# Patient Record
Sex: FEMALE | Race: WHITE | NOT HISPANIC OR LATINO | URBAN - METROPOLITAN AREA
[De-identification: names, ages, dates, MRNs, and addresses within clinical notes are randomized per-mention and may not be internally consistent; named-entity substitution may affect disease eponyms.]

---

## 2017-01-31 ENCOUNTER — OUTPATIENT (OUTPATIENT)
Dept: OUTPATIENT SERVICES | Facility: HOSPITAL | Age: 57
LOS: 1 days | End: 2017-01-31
Payer: COMMERCIAL

## 2017-01-31 ENCOUNTER — APPOINTMENT (OUTPATIENT)
Dept: ULTRASOUND IMAGING | Facility: IMAGING CENTER | Age: 57
End: 2017-01-31

## 2017-01-31 DIAGNOSIS — Z00.8 ENCOUNTER FOR OTHER GENERAL EXAMINATION: ICD-10-CM

## 2017-01-31 PROCEDURE — 76642 ULTRASOUND BREAST LIMITED: CPT

## 2017-01-31 PROCEDURE — 76642 ULTRASOUND BREAST LIMITED: CPT | Mod: 26,RT

## 2017-02-22 ENCOUNTER — APPOINTMENT (OUTPATIENT)
Dept: SURGICAL ONCOLOGY | Facility: CLINIC | Age: 57
End: 2017-02-22

## 2017-02-22 VITALS
WEIGHT: 138 LBS | HEIGHT: 62 IN | SYSTOLIC BLOOD PRESSURE: 129 MMHG | DIASTOLIC BLOOD PRESSURE: 85 MMHG | BODY MASS INDEX: 25.4 KG/M2 | TEMPERATURE: 97.9 F | HEART RATE: 68 BPM | OXYGEN SATURATION: 100 % | RESPIRATION RATE: 14 BRPM

## 2017-03-10 ENCOUNTER — APPOINTMENT (OUTPATIENT)
Dept: ORTHOPEDIC SURGERY | Facility: CLINIC | Age: 57
End: 2017-03-10

## 2017-03-10 VITALS
HEART RATE: 73 BPM | SYSTOLIC BLOOD PRESSURE: 139 MMHG | HEIGHT: 62 IN | BODY MASS INDEX: 25.4 KG/M2 | DIASTOLIC BLOOD PRESSURE: 85 MMHG | WEIGHT: 138 LBS

## 2017-03-10 DIAGNOSIS — M25.511 PAIN IN RIGHT SHOULDER: ICD-10-CM

## 2017-06-06 ENCOUNTER — APPOINTMENT (OUTPATIENT)
Dept: ORTHOPEDIC SURGERY | Facility: CLINIC | Age: 57
End: 2017-06-06

## 2017-06-06 VITALS — WEIGHT: 138 LBS | HEIGHT: 62 IN | BODY MASS INDEX: 25.4 KG/M2

## 2017-06-06 DIAGNOSIS — M75.51 BURSITIS OF RIGHT SHOULDER: ICD-10-CM

## 2017-06-07 PROBLEM — M75.51 ACUTE BURSITIS OF RIGHT SHOULDER: Status: ACTIVE | Noted: 2017-03-13

## 2017-07-19 ENCOUNTER — APPOINTMENT (OUTPATIENT)
Dept: MAMMOGRAPHY | Facility: CLINIC | Age: 57
End: 2017-07-19

## 2017-07-19 ENCOUNTER — APPOINTMENT (OUTPATIENT)
Dept: ULTRASOUND IMAGING | Facility: CLINIC | Age: 57
End: 2017-07-19

## 2017-08-07 ENCOUNTER — APPOINTMENT (OUTPATIENT)
Dept: MAMMOGRAPHY | Facility: CLINIC | Age: 57
End: 2017-08-07

## 2017-08-07 ENCOUNTER — APPOINTMENT (OUTPATIENT)
Dept: ULTRASOUND IMAGING | Facility: CLINIC | Age: 57
End: 2017-08-07

## 2017-08-07 ENCOUNTER — OUTPATIENT (OUTPATIENT)
Dept: OUTPATIENT SERVICES | Facility: HOSPITAL | Age: 57
LOS: 1 days | End: 2017-08-07
Payer: COMMERCIAL

## 2017-08-07 DIAGNOSIS — Z00.8 ENCOUNTER FOR OTHER GENERAL EXAMINATION: ICD-10-CM

## 2017-08-07 PROCEDURE — 77065 DX MAMMO INCL CAD UNI: CPT

## 2017-08-07 PROCEDURE — 77063 BREAST TOMOSYNTHESIS BI: CPT

## 2017-08-07 PROCEDURE — 77067 SCR MAMMO BI INCL CAD: CPT

## 2017-08-07 PROCEDURE — G0279: CPT

## 2017-08-07 PROCEDURE — 77066 DX MAMMO INCL CAD BI: CPT

## 2017-08-07 PROCEDURE — 76641 ULTRASOUND BREAST COMPLETE: CPT

## 2017-08-10 DIAGNOSIS — N63 UNSPECIFIED LUMP IN BREAST: ICD-10-CM

## 2017-08-10 DIAGNOSIS — R92.2 INCONCLUSIVE MAMMOGRAM: ICD-10-CM

## 2017-08-10 DIAGNOSIS — R92.1 MAMMOGRAPHIC CALCIFICATION FOUND ON DIAGNOSTIC IMAGING OF BREAST: ICD-10-CM

## 2017-08-10 DIAGNOSIS — Z12.31 ENCOUNTER FOR SCREENING MAMMOGRAM FOR MALIGNANT NEOPLASM OF BREAST: ICD-10-CM

## 2017-09-25 ENCOUNTER — APPOINTMENT (OUTPATIENT)
Dept: SURGICAL ONCOLOGY | Facility: CLINIC | Age: 57
End: 2017-09-25
Payer: COMMERCIAL

## 2017-09-25 VITALS
HEART RATE: 68 BPM | SYSTOLIC BLOOD PRESSURE: 126 MMHG | RESPIRATION RATE: 16 BRPM | HEIGHT: 62 IN | BODY MASS INDEX: 25.76 KG/M2 | WEIGHT: 140 LBS | DIASTOLIC BLOOD PRESSURE: 78 MMHG | OXYGEN SATURATION: 98 %

## 2017-09-25 PROCEDURE — 99214 OFFICE O/P EST MOD 30 MIN: CPT

## 2017-11-17 ENCOUNTER — OUTPATIENT (OUTPATIENT)
Dept: OUTPATIENT SERVICES | Facility: HOSPITAL | Age: 57
LOS: 1 days | End: 2017-11-17
Payer: COMMERCIAL

## 2017-11-17 ENCOUNTER — APPOINTMENT (OUTPATIENT)
Dept: ULTRASOUND IMAGING | Facility: CLINIC | Age: 57
End: 2017-11-17
Payer: COMMERCIAL

## 2017-11-17 DIAGNOSIS — Z00.8 ENCOUNTER FOR OTHER GENERAL EXAMINATION: ICD-10-CM

## 2017-11-17 PROCEDURE — 76856 US EXAM PELVIC COMPLETE: CPT

## 2017-11-17 PROCEDURE — 76830 TRANSVAGINAL US NON-OB: CPT | Mod: 26

## 2017-11-17 PROCEDURE — 76856 US EXAM PELVIC COMPLETE: CPT | Mod: 26

## 2017-11-17 PROCEDURE — 76830 TRANSVAGINAL US NON-OB: CPT

## 2018-02-05 ENCOUNTER — RESULT REVIEW (OUTPATIENT)
Age: 58
End: 2018-02-05

## 2018-02-15 ENCOUNTER — APPOINTMENT (OUTPATIENT)
Dept: MAMMOGRAPHY | Facility: CLINIC | Age: 58
End: 2018-02-15
Payer: COMMERCIAL

## 2018-02-15 ENCOUNTER — OUTPATIENT (OUTPATIENT)
Dept: OUTPATIENT SERVICES | Facility: HOSPITAL | Age: 58
LOS: 1 days | End: 2018-02-15
Payer: COMMERCIAL

## 2018-02-15 DIAGNOSIS — Z00.8 ENCOUNTER FOR OTHER GENERAL EXAMINATION: ICD-10-CM

## 2018-02-15 PROCEDURE — G0279: CPT | Mod: 26

## 2018-02-15 PROCEDURE — 77065 DX MAMMO INCL CAD UNI: CPT | Mod: 26,LT

## 2018-02-15 PROCEDURE — 77065 DX MAMMO INCL CAD UNI: CPT

## 2018-02-15 PROCEDURE — G0279: CPT

## 2018-02-19 ENCOUNTER — APPOINTMENT (OUTPATIENT)
Dept: ULTRASOUND IMAGING | Facility: CLINIC | Age: 58
End: 2018-02-19
Payer: COMMERCIAL

## 2018-02-19 ENCOUNTER — OUTPATIENT (OUTPATIENT)
Dept: OUTPATIENT SERVICES | Facility: HOSPITAL | Age: 58
LOS: 1 days | End: 2018-02-19
Payer: COMMERCIAL

## 2018-02-19 DIAGNOSIS — Z00.8 ENCOUNTER FOR OTHER GENERAL EXAMINATION: ICD-10-CM

## 2018-02-19 PROCEDURE — 76856 US EXAM PELVIC COMPLETE: CPT | Mod: 26

## 2018-02-19 PROCEDURE — 76856 US EXAM PELVIC COMPLETE: CPT

## 2018-02-19 PROCEDURE — 76830 TRANSVAGINAL US NON-OB: CPT

## 2018-02-19 PROCEDURE — 76830 TRANSVAGINAL US NON-OB: CPT | Mod: 26

## 2018-02-26 ENCOUNTER — APPOINTMENT (OUTPATIENT)
Dept: SURGICAL ONCOLOGY | Facility: CLINIC | Age: 58
End: 2018-02-26
Payer: COMMERCIAL

## 2018-02-26 VITALS
HEART RATE: 66 BPM | BODY MASS INDEX: 25.76 KG/M2 | HEIGHT: 62 IN | WEIGHT: 140 LBS | RESPIRATION RATE: 15 BRPM | SYSTOLIC BLOOD PRESSURE: 119 MMHG | DIASTOLIC BLOOD PRESSURE: 81 MMHG

## 2018-02-26 PROCEDURE — 99214 OFFICE O/P EST MOD 30 MIN: CPT

## 2018-08-16 ENCOUNTER — APPOINTMENT (OUTPATIENT)
Dept: MAMMOGRAPHY | Facility: CLINIC | Age: 58
End: 2018-08-16
Payer: COMMERCIAL

## 2018-08-16 ENCOUNTER — APPOINTMENT (OUTPATIENT)
Dept: ULTRASOUND IMAGING | Facility: CLINIC | Age: 58
End: 2018-08-16
Payer: COMMERCIAL

## 2018-08-16 ENCOUNTER — OUTPATIENT (OUTPATIENT)
Dept: OUTPATIENT SERVICES | Facility: HOSPITAL | Age: 58
LOS: 1 days | End: 2018-08-16
Payer: COMMERCIAL

## 2018-08-16 DIAGNOSIS — Z00.8 ENCOUNTER FOR OTHER GENERAL EXAMINATION: ICD-10-CM

## 2018-08-16 PROCEDURE — G0279: CPT | Mod: 26

## 2018-08-16 PROCEDURE — 76641 ULTRASOUND BREAST COMPLETE: CPT | Mod: 26,50

## 2018-08-16 PROCEDURE — 77066 DX MAMMO INCL CAD BI: CPT | Mod: 26

## 2018-08-16 PROCEDURE — G0279: CPT

## 2018-08-16 PROCEDURE — 76641 ULTRASOUND BREAST COMPLETE: CPT

## 2018-08-16 PROCEDURE — 77066 DX MAMMO INCL CAD BI: CPT

## 2018-08-27 ENCOUNTER — APPOINTMENT (OUTPATIENT)
Dept: SURGICAL ONCOLOGY | Facility: CLINIC | Age: 58
End: 2018-08-27
Payer: COMMERCIAL

## 2018-08-27 VITALS
WEIGHT: 140 LBS | BODY MASS INDEX: 25.76 KG/M2 | RESPIRATION RATE: 15 BRPM | DIASTOLIC BLOOD PRESSURE: 83 MMHG | SYSTOLIC BLOOD PRESSURE: 138 MMHG | HEIGHT: 62 IN | HEART RATE: 69 BPM

## 2018-08-27 DIAGNOSIS — R92.1 MAMMOGRAPHIC CALCIFICATION FOUND ON DIAGNOSTIC IMAGING OF BREAST: ICD-10-CM

## 2018-08-27 PROCEDURE — 99214 OFFICE O/P EST MOD 30 MIN: CPT

## 2018-09-23 ENCOUNTER — TRANSCRIPTION ENCOUNTER (OUTPATIENT)
Age: 58
End: 2018-09-23

## 2018-11-21 ENCOUNTER — OUTPATIENT (OUTPATIENT)
Dept: OUTPATIENT SERVICES | Facility: HOSPITAL | Age: 58
LOS: 1 days | End: 2018-11-21
Payer: COMMERCIAL

## 2018-11-21 ENCOUNTER — APPOINTMENT (OUTPATIENT)
Dept: RADIOLOGY | Facility: CLINIC | Age: 58
End: 2018-11-21
Payer: COMMERCIAL

## 2018-11-21 DIAGNOSIS — Z00.8 ENCOUNTER FOR OTHER GENERAL EXAMINATION: ICD-10-CM

## 2018-11-21 PROCEDURE — 71046 X-RAY EXAM CHEST 2 VIEWS: CPT | Mod: 26

## 2018-11-21 PROCEDURE — 71046 X-RAY EXAM CHEST 2 VIEWS: CPT

## 2019-02-11 ENCOUNTER — RESULT REVIEW (OUTPATIENT)
Age: 59
End: 2019-02-11

## 2019-03-04 ENCOUNTER — TRANSCRIPTION ENCOUNTER (OUTPATIENT)
Age: 59
End: 2019-03-04

## 2019-07-22 ENCOUNTER — EMERGENCY (EMERGENCY)
Facility: HOSPITAL | Age: 59
LOS: 0 days | Discharge: HOME | End: 2019-07-22
Attending: EMERGENCY MEDICINE | Admitting: EMERGENCY MEDICINE
Payer: COMMERCIAL

## 2019-07-22 ENCOUNTER — TRANSCRIPTION ENCOUNTER (OUTPATIENT)
Age: 59
End: 2019-07-22

## 2019-07-22 VITALS
HEART RATE: 65 BPM | OXYGEN SATURATION: 100 % | RESPIRATION RATE: 17 BRPM | TEMPERATURE: 98 F | HEIGHT: 63 IN | DIASTOLIC BLOOD PRESSURE: 86 MMHG | SYSTOLIC BLOOD PRESSURE: 145 MMHG | WEIGHT: 139.99 LBS
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
## 2019-07-22 DIAGNOSIS — S61.211A LACERATION WITHOUT FOREIGN BODY OF LEFT INDEX FINGER WITHOUT DAMAGE TO NAIL, INITIAL ENCOUNTER: ICD-10-CM
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
## 2019-07-22 PROCEDURE — 99283 EMERGENCY DEPT VISIT LOW MDM: CPT

## 2019-07-22 PROCEDURE — 29130 APPL FINGER SPLINT STATIC: CPT | Mod: F1

## 2019-07-22 PROCEDURE — 12001 RPR S/N/AX/GEN/TRNK 2.5CM/<: CPT | Mod: 59

## 2019-07-22 NOTE — ED STATDOCS - PROGRESS NOTE DETAILS
GRACE Jerome:   Patient has been seen, evaluated and orders have been written by the attending in intake. Patient is stable.  I will follow up the results of orders written and I will continue to evaluate/observe the patient.  Emeli Jerome PA-C Superficial lac to left index finger irrigated copiously.  Applied mult levels of Dermabond s incident.  Covered with Band aid and finger splint applied.  Td UTD.   DC home.  Emeli Jerome PA-C

## 2019-07-22 NOTE — ED STATDOCS - SKIN, MLM
skin normal color for race, warm, dry. + laceration to dorsal aspect of left index finger, proximal phalanx

## 2019-07-22 NOTE — ED PROCEDURE NOTE - PROCEDURE ADDITIONAL DETAILS
1.5 cm lac to left 2nd finger prox to PIP joint.  Wound not on joint.  Able to FROM without wound opening.  Neg tendon exposed or damage.  Dermabond without incident.  DAISHA fuentes PA-C

## 2019-07-22 NOTE — ED PROCEDURE NOTE - CPROC ED POST PROC CARE GUIDE1
Instructed patient/caregiver regarding signs and symptoms of infection./Elevate the injured extremity as instructed.
Instructed patient/caregiver regarding signs and symptoms of infection.

## 2019-07-22 NOTE — ED STATDOCS - OBJECTIVE STATEMENT
60 y/o female with no relevant PMHx presents to the ED c/o laceration to her left index finger x today. Pt states that she was cutting watermelon today when the knife slipped and the pt cut her index finger. Pt states she immediately put pressure on the wound and wrapped it. Pt is right hand dominant. Tetanus shot is UTD.

## 2019-07-22 NOTE — ED STATDOCS - ATTENDING CONTRIBUTION TO CARE
Attending Contribution to Care: I, Ladi Hoyos, performed the initial face to face bedside interview with this patient regarding history of present illness, review of symptoms and relevant past medical, social and family history.  I completed an independent physical examination.  I was the initial provider who evaluated this patient and the history, physical, and MDM reflect this intial assessment. I have signed out the follow up of any pending tests after the original (i.e. labs, radiological studies) to the ACP with instructions to review any with instructions to review any concerning findings to me prior to discharge.  I have communicated the patient’s plan of care and disposition with the ACP.

## 2019-08-06 ENCOUNTER — FORM ENCOUNTER (OUTPATIENT)
Age: 59
End: 2019-08-06

## 2019-08-07 ENCOUNTER — APPOINTMENT (OUTPATIENT)
Dept: ULTRASOUND IMAGING | Facility: CLINIC | Age: 59
End: 2019-08-07
Payer: COMMERCIAL

## 2019-08-07 ENCOUNTER — OUTPATIENT (OUTPATIENT)
Dept: OUTPATIENT SERVICES | Facility: HOSPITAL | Age: 59
LOS: 1 days | End: 2019-08-07
Payer: COMMERCIAL

## 2019-08-07 ENCOUNTER — APPOINTMENT (OUTPATIENT)
Dept: RADIOLOGY | Facility: CLINIC | Age: 59
End: 2019-08-07
Payer: COMMERCIAL

## 2019-08-07 ENCOUNTER — APPOINTMENT (OUTPATIENT)
Dept: MAMMOGRAPHY | Facility: CLINIC | Age: 59
End: 2019-08-07
Payer: COMMERCIAL

## 2019-08-07 DIAGNOSIS — Z00.8 ENCOUNTER FOR OTHER GENERAL EXAMINATION: ICD-10-CM

## 2019-08-07 DIAGNOSIS — R92.2 INCONCLUSIVE MAMMOGRAM: ICD-10-CM

## 2019-08-07 DIAGNOSIS — Z12.31 ENCOUNTER FOR SCREENING MAMMOGRAM FOR MALIGNANT NEOPLASM OF BREAST: ICD-10-CM

## 2019-08-07 DIAGNOSIS — Z13.820 ENCOUNTER FOR SCREENING FOR OSTEOPOROSIS: ICD-10-CM

## 2019-08-07 PROCEDURE — 77080 DXA BONE DENSITY AXIAL: CPT | Mod: 26

## 2019-08-07 PROCEDURE — 77063 BREAST TOMOSYNTHESIS BI: CPT | Mod: 26

## 2019-08-07 PROCEDURE — 76641 ULTRASOUND BREAST COMPLETE: CPT | Mod: 26,50

## 2019-08-07 PROCEDURE — 77067 SCR MAMMO BI INCL CAD: CPT | Mod: 26

## 2019-08-07 PROCEDURE — 76830 TRANSVAGINAL US NON-OB: CPT

## 2019-08-07 PROCEDURE — 76830 TRANSVAGINAL US NON-OB: CPT | Mod: 26

## 2019-08-07 PROCEDURE — 76856 US EXAM PELVIC COMPLETE: CPT | Mod: 26

## 2019-08-07 PROCEDURE — 76856 US EXAM PELVIC COMPLETE: CPT

## 2019-08-07 PROCEDURE — 77063 BREAST TOMOSYNTHESIS BI: CPT

## 2019-08-07 PROCEDURE — 77067 SCR MAMMO BI INCL CAD: CPT

## 2019-08-07 PROCEDURE — 77080 DXA BONE DENSITY AXIAL: CPT

## 2019-08-07 PROCEDURE — 76641 ULTRASOUND BREAST COMPLETE: CPT

## 2019-09-04 ENCOUNTER — APPOINTMENT (OUTPATIENT)
Dept: SURGICAL ONCOLOGY | Facility: CLINIC | Age: 59
End: 2019-09-04
Payer: COMMERCIAL

## 2019-09-04 VITALS
HEIGHT: 62 IN | RESPIRATION RATE: 17 BRPM | BODY MASS INDEX: 25.76 KG/M2 | SYSTOLIC BLOOD PRESSURE: 124 MMHG | TEMPERATURE: 98.1 F | HEART RATE: 70 BPM | WEIGHT: 140 LBS | DIASTOLIC BLOOD PRESSURE: 77 MMHG

## 2019-09-04 PROCEDURE — 99214 OFFICE O/P EST MOD 30 MIN: CPT

## 2019-09-04 NOTE — HISTORY OF PRESENT ILLNESS
[de-identified] : Zoe is a 59 year old female who presents to office for her annual Breast Exam follow up visit.\par \par Hx: benign RIGHT breast surgical biopsy on May 4, 2005; benign RIGHT stereotactic biopsy in 2007; benign RIGHT ultrasound-guided cyst aspiration on January 15, 2014. \par \par Most recent bilateral mammogram/sonogram performed on 8/7/19 demonstrated extremely dense breast tissue, stable focal calcifications left breast 3:00 anteriorly, no mammographic evidence of malignancy, multiple stable hypoechoic nodules in the left breast. IMP: right breast cyst and stable hypoechoic nodules left breast (BIRADS 2).  At this time patient denies breast pain, palpable masses and/or nipple discharge.  \par \par Denies personal or family history of breast or ovarian cancer. \par \par Internist/Gyn: Dr. Cosby

## 2019-09-04 NOTE — CONSULT LETTER
[Dear  ___] : Dear  [unfilled], [Please see my note below.] : Please see my note below. [Courtesy Letter:] : I had the pleasure of seeing your patient, [unfilled], in my office today. [Sincerely,] : Sincerely, [Consult Closing:] : Thank you very much for allowing me to participate in the care of this patient.  If you have any questions, please do not hesitate to contact me. [FreeTextEntry1] : I will keep you informed of my annual follow-up. [DrBrittney  ___] : Dr. DOMINGUEZ [FreeTextEntry3] : Bhavesh Banks MD FACS\par Chief of Surgical Oncology\par \par

## 2019-09-04 NOTE — ASSESSMENT
[FreeTextEntry1] : IMP:\par Fibrocystic changes\par \par \par PLAN:\par RTO yearly\par Bilateral mammo/sono Aug 2020

## 2019-09-04 NOTE — PHYSICAL EXAM
[Normal] : supple, no neck mass and thyroid not enlarged [Normal Supraclavicular Lymph Nodes] : normal supraclavicular lymph nodes [Normal Axillary Lymph Nodes] : normal axillary lymph nodes [Normal] : oriented to person, place and time, with appropriate affect [de-identified] : Fibrocystic but no masses or nipple discharge bilaterally.  [FreeTextEntry1] : Deferred

## 2020-03-04 ENCOUNTER — RESULT REVIEW (OUTPATIENT)
Age: 60
End: 2020-03-04

## 2020-04-25 ENCOUNTER — MESSAGE (OUTPATIENT)
Age: 60
End: 2020-04-25

## 2020-05-05 LAB
SARS-COV-2 IGG SERPL IA-ACNC: 0.9 RATIO
SARS-COV-2 IGG SERPL QL IA: ABNORMAL

## 2020-10-06 NOTE — ED STATDOCS - TOBACCO USE
Physical Therapy    Facility/Department: [facility] ICU  Initial Assessment/Discharge Summary    NAME: Gray Cason  : 1948  MRN: [id number]    Date of Service: 10/6/2020    Discharge Recommendations:  Gray Cason scored a 24/24 on the AM-PAC short mobility form. At this time, no further PT is recommended upon discharge due to pt returning to baseline function. Recommend patient returns to prior setting with prior services. PT Equipment Recommendations  Equipment Needed: No    Assessment   Assessment: Pt presents at baseline function, no additional PT indicated  Decision Making: Low Complexity  PT Education: PT Role;Plan of Care  Patient Education: Pt educated on DC plan, verbalized understanding  Barriers to Learning: none  No Skilled PT: At baseline function  REQUIRES PT FOLLOW UP: No  Activity Tolerance  Activity Tolerance: Patient Tolerated treatment well  Activity Tolerance: Pt tolerated treatment very well and was able to complete all functional tasks independently and without exacerbation of any symptoms. Patient Diagnosis(es): The primary encounter diagnosis was TIA (transient ischemic attack). A diagnosis of Uncontrolled hypertension was also pertinent to this visit. has a past medical history of Bulging lumbar disc, Degenerative disc disease, lumbar, and Thyroid disease. has a past surgical history that includes Blepharoplasty (Left) and Dental surgery. Restrictions  Restrictions/Precautions  Restrictions/Precautions: Fall Risk(Medium fall risk)  Required Braces or Orthoses?: No  Position Activity Restriction  Other position/activity restrictions: 67 y.o. female who presents to the emergency department with chief complaint of expressive aphasia-word searching, left-sided headache and vision changes. Last occurred on Friday. Since then she is had elevated blood pressure despite taking her blood pressure medications. No chest pain shortness of breath or current headache.   Her headache the other day when the symptoms were occurring worse left-sided parietal achy 4/10. Vision/Hearing  Vision: Impaired  Vision Exceptions: Wears glasses for reading  Hearing: Within functional limits     Subjective  General  Chart Reviewed: Yes  Additional Pertinent Hx: DDD lumbar spine with bulging disc addressed in ER 8/2020, hypthyroidism  Family / Caregiver Present: No  Diagnosis: TIA  Follows Commands: Within Functional Limits  General Comment  Comments: Pt found laying in bed awake  Subjective  Subjective: Pt denies any pain at this time. Reports feeling great and like her normal self.   Pain Screening  Patient Currently in Pain: Denies  Vital Signs  Patient Currently in Pain: Denies     Orientation  Orientation  Overall Orientation Status: Within Functional Limits  Social/Functional History  Social/Functional History  Lives With: Spouse()  Type of Home: House  Home Layout: Two level, Bed/Bath upstairs, Laundry in basement  Home Access: Stairs to enter without rails  Entrance Stairs - Number of Steps: 2 steps to enter  Bathroom Shower/Tub: Walk-in shower  Bathroom Toilet: Standard(Raised)  Bathroom Equipment: Shower chair  Home Equipment: Cane, 4 wheeled walker(Doesn't currently use them)  Receives Help From: Family(spouse primarily, daughter can if needed)  ADL Assistance: Independent  Homemaking Assistance: Independent  Homemaking Responsibilities: Yes  Ambulation Assistance: Independent  Transfer Assistance: Independent  Active : Yes  Mode of Transportation: Car  Occupation: Retired  Type of occupation: teacher  Leisure & Hobbies: visiting family, reading, walk new dog  Additional Comments: no falls in past 6 months  Cognition      Objective  Observation/Palpation  Posture: Good    AROM RLE (degrees)  RLE AROM: WFL  AROM LLE (degrees)  LLE AROM : WFL  Strength RLE  Strength RLE: WFL  Comment: hip flexion 4+/5, knee extension and dorsiflexion 5/5  Strength LLE  Strength LLE: WFL  Comment: hip flexion 4+/5, knee extension and dorsiflexion 5/5  Coordination  Heel to Shin: Normal  Sensation  Overall Sensation Status: WFL  Bed mobility  Supine to Sit: Independent  Scooting: Independent  Transfers  Sit to Stand: Independent  Stand to sit: Independent  Ambulation  Ambulation?: Yes  Ambulation 1  Surface: level tile  Device: No Device  Assistance: Independent  Quality of Gait: Good nitza with step throuh gait pattern. Appropriate step length as well. Pt tends to walk either with a slightly widened CLEO, but will adjust occassionally without cuing. Gait Deviations: None  Distance: 550 feet  Stairs/Curb  Stairs?: Yes  Stairs  # Steps : 12  Rails: None(Pt did not utilize rails ascending and descending)  Device: No Device  Assistance: Independent  Comment: Pt demonstrated a reciprocal pattern with ascending and descending stairs. Did not utilize the railings for any ambulation. Balance  Posture: Good  Sitting - Static: Good  Sitting - Dynamic: Good  Standing - Static: Good  Standing - Dynamic: Good      Plan   Plan  Times per week: DC acute PT  Safety Devices  Type of devices: Call light within reach, Left in chair, Nurse notified, Patient at risk for falls    AM-PAC Score  AM-PAC Inpatient Mobility Raw Score : 24 (10/06/20 1249)  AM-PAC Inpatient T-Scale Score : 61.14 (10/06/20 1249)  Mobility Inpatient CMS 0-100% Score: 0 (10/06/20 1249)  Mobility Inpatient CMS G-Code Modifier : [address] (10/06/20 1249)     Goals  Patient Goals   Patient goals : No acute PT goals       Therapy Time   Individual Concurrent Group Co-treatment   Time In 1046         Time Out 1106         Minutes 20         Timed Code Treatment Minutes: 0 Minutes     Janiya Esquivel, SPT  Eboni Grimes, PT  Therapist observed and directed the above evaluation.  Thanks, Aissatou Mejias, DPT [id number] Unknown if ever smoked

## 2020-10-09 ENCOUNTER — OUTPATIENT (OUTPATIENT)
Dept: OUTPATIENT SERVICES | Facility: HOSPITAL | Age: 60
LOS: 1 days | End: 2020-10-09
Payer: COMMERCIAL

## 2020-10-09 ENCOUNTER — APPOINTMENT (OUTPATIENT)
Dept: ULTRASOUND IMAGING | Facility: CLINIC | Age: 60
End: 2020-10-09
Payer: COMMERCIAL

## 2020-10-09 ENCOUNTER — RESULT REVIEW (OUTPATIENT)
Age: 60
End: 2020-10-09

## 2020-10-09 ENCOUNTER — APPOINTMENT (OUTPATIENT)
Dept: MAMMOGRAPHY | Facility: CLINIC | Age: 60
End: 2020-10-09
Payer: COMMERCIAL

## 2020-10-09 DIAGNOSIS — Z00.8 ENCOUNTER FOR OTHER GENERAL EXAMINATION: ICD-10-CM

## 2020-10-09 PROCEDURE — 77067 SCR MAMMO BI INCL CAD: CPT

## 2020-10-09 PROCEDURE — 77067 SCR MAMMO BI INCL CAD: CPT | Mod: 26

## 2020-10-09 PROCEDURE — 77063 BREAST TOMOSYNTHESIS BI: CPT | Mod: 26

## 2020-10-09 PROCEDURE — 76641 ULTRASOUND BREAST COMPLETE: CPT | Mod: 26,50

## 2020-10-09 PROCEDURE — 77063 BREAST TOMOSYNTHESIS BI: CPT

## 2020-10-09 PROCEDURE — 76641 ULTRASOUND BREAST COMPLETE: CPT

## 2020-10-29 ENCOUNTER — APPOINTMENT (OUTPATIENT)
Dept: SURGICAL ONCOLOGY | Facility: CLINIC | Age: 60
End: 2020-10-29
Payer: COMMERCIAL

## 2020-10-29 VITALS
HEIGHT: 62 IN | WEIGHT: 140 LBS | RESPIRATION RATE: 16 BRPM | TEMPERATURE: 97.9 F | SYSTOLIC BLOOD PRESSURE: 135 MMHG | DIASTOLIC BLOOD PRESSURE: 83 MMHG | HEART RATE: 65 BPM | BODY MASS INDEX: 25.76 KG/M2 | OXYGEN SATURATION: 98 %

## 2020-10-29 PROCEDURE — 99214 OFFICE O/P EST MOD 30 MIN: CPT

## 2020-10-29 PROCEDURE — 99072 ADDL SUPL MATRL&STAF TM PHE: CPT

## 2020-10-29 NOTE — CONSULT LETTER
[Dear  ___] : Dear  [unfilled], [Courtesy Letter:] : I had the pleasure of seeing your patient, [unfilled], in my office today. [Please see my note below.] : Please see my note below. [Consult Closing:] : Thank you very much for allowing me to participate in the care of this patient.  If you have any questions, please do not hesitate to contact me. [Sincerely,] : Sincerely, [FreeTextEntry1] : I will keep you informed of my annual follow-up. [FreeTextEntry3] : Bhavesh Banks MD FACS\par Chief of Surgical Oncology\par \par

## 2020-10-29 NOTE — ASSESSMENT
[FreeTextEntry1] : IMP:\par Fibrocystic changes\par No evidence of new or recurrent lesions. Breast imaging failed to identify any suspicious lesions. BI-RADS 1. \par No suspicious lesion on exam. \par \par \par PLAN:\par RTO yearly\par Bilateral mammo/Sono 10/ 2021

## 2020-10-29 NOTE — PHYSICAL EXAM
[Normal] : supple, no neck mass and thyroid not enlarged [Normal Supraclavicular Lymph Nodes] : normal supraclavicular lymph nodes [Normal Axillary Lymph Nodes] : normal axillary lymph nodes [Normal] : oriented to person, place and time, with appropriate affect [de-identified] : Fibrocystic but no masses or nipple discharge bilaterally.  [FreeTextEntry1] : Deferred

## 2020-10-29 NOTE — HISTORY OF PRESENT ILLNESS
[de-identified] : Zoe is a 60 year old female who presents to office for her annual Breast Exam follow up visit.\par \par Hx: benign RIGHT breast surgical biopsy on May 4, 2005; benign RIGHT stereotactic biopsy in 2007; benign RIGHT ultrasound-guided cyst aspiration on January 15, 2014. \par \par Most recent bilateral mammogram/sonogram performed on 10/9/2020  demonstrated dense breast tissue, Scattered additional calcifications are identified. The previously noted circumferential calcification within the posterior central left breast appear to have resorbed. Scattered diffuse nodularity is again noted bilaterally. Nodular asymmetry within the deep posterior outer right breast close to the chest wall appears unchanged. Vague nodular asymmetry within the inner left breast appears unchanged as well. No mammographic or sonographic evidence of malignancy. Stable left breast nodules on ultrasound, as above. BI-RADS 1.\par \par At this time patient denies breast pain, palpable masses and/or nipple discharge.  \par \par Denies personal or family history of breast or ovarian cancer. \par \par Internist/Gyn: Dr. Cosby

## 2021-04-22 ENCOUNTER — RESULT REVIEW (OUTPATIENT)
Age: 61
End: 2021-04-22

## 2021-07-31 ENCOUNTER — TRANSCRIPTION ENCOUNTER (OUTPATIENT)
Age: 61
End: 2021-07-31

## 2021-10-15 ENCOUNTER — RESULT REVIEW (OUTPATIENT)
Age: 61
End: 2021-10-15

## 2021-10-15 ENCOUNTER — APPOINTMENT (OUTPATIENT)
Dept: ULTRASOUND IMAGING | Facility: CLINIC | Age: 61
End: 2021-10-15
Payer: COMMERCIAL

## 2021-10-15 ENCOUNTER — APPOINTMENT (OUTPATIENT)
Dept: MAMMOGRAPHY | Facility: CLINIC | Age: 61
End: 2021-10-15
Payer: COMMERCIAL

## 2021-10-15 ENCOUNTER — OUTPATIENT (OUTPATIENT)
Dept: OUTPATIENT SERVICES | Facility: HOSPITAL | Age: 61
LOS: 1 days | End: 2021-10-15
Payer: COMMERCIAL

## 2021-10-15 DIAGNOSIS — Z12.31 ENCOUNTER FOR SCREENING MAMMOGRAM FOR MALIGNANT NEOPLASM OF BREAST: ICD-10-CM

## 2021-10-15 PROCEDURE — 77063 BREAST TOMOSYNTHESIS BI: CPT | Mod: 26

## 2021-10-15 PROCEDURE — 77067 SCR MAMMO BI INCL CAD: CPT

## 2021-10-15 PROCEDURE — 76641 ULTRASOUND BREAST COMPLETE: CPT | Mod: 26,50

## 2021-10-15 PROCEDURE — 77063 BREAST TOMOSYNTHESIS BI: CPT

## 2021-10-15 PROCEDURE — 77067 SCR MAMMO BI INCL CAD: CPT | Mod: 26

## 2021-10-15 PROCEDURE — 76641 ULTRASOUND BREAST COMPLETE: CPT

## 2021-11-01 ENCOUNTER — OUTPATIENT (OUTPATIENT)
Dept: OUTPATIENT SERVICES | Facility: HOSPITAL | Age: 61
LOS: 1 days | End: 2021-11-01
Payer: COMMERCIAL

## 2021-11-01 ENCOUNTER — APPOINTMENT (OUTPATIENT)
Dept: MAMMOGRAPHY | Facility: CLINIC | Age: 61
End: 2021-11-01
Payer: COMMERCIAL

## 2021-11-01 ENCOUNTER — APPOINTMENT (OUTPATIENT)
Dept: ULTRASOUND IMAGING | Facility: CLINIC | Age: 61
End: 2021-11-01
Payer: COMMERCIAL

## 2021-11-01 DIAGNOSIS — Z00.8 ENCOUNTER FOR OTHER GENERAL EXAMINATION: ICD-10-CM

## 2021-11-01 PROCEDURE — 76641 ULTRASOUND BREAST COMPLETE: CPT | Mod: 26,50

## 2021-11-01 PROCEDURE — 76641 ULTRASOUND BREAST COMPLETE: CPT

## 2021-11-15 ENCOUNTER — APPOINTMENT (OUTPATIENT)
Dept: SURGICAL ONCOLOGY | Facility: CLINIC | Age: 61
End: 2021-11-15
Payer: COMMERCIAL

## 2021-11-15 VITALS
BODY MASS INDEX: 26.13 KG/M2 | WEIGHT: 142 LBS | TEMPERATURE: 97.7 F | DIASTOLIC BLOOD PRESSURE: 84 MMHG | RESPIRATION RATE: 16 BRPM | HEIGHT: 62 IN | OXYGEN SATURATION: 99 % | SYSTOLIC BLOOD PRESSURE: 136 MMHG | HEART RATE: 67 BPM

## 2021-11-15 DIAGNOSIS — R92.8 OTHER ABNORMAL AND INCONCLUSIVE FINDINGS ON DIAGNOSTIC IMAGING OF BREAST: ICD-10-CM

## 2021-11-15 PROCEDURE — 99214 OFFICE O/P EST MOD 30 MIN: CPT

## 2021-11-15 NOTE — HISTORY OF PRESENT ILLNESS
[de-identified] : Zoe is a 61 year old female who presents to office for her annual Breast Exam follow up visit.\par \par Hx: benign RIGHT breast surgical biopsy on May 4, 2005; benign RIGHT stereotactic biopsy in 2007; benign RIGHT ultrasound-guided cyst aspiration on January 15, 2014. \par \par B/L Mammo/sono 10/15/2021-  No mammographic evidence of malignancy.  Submitted images for bilateral screening ultrasound are technically limited. Indeterminate nodule in the upper outer left breast.  Recommend repeat B/L breast ultrasound for further evaluation.  BIRADS 0\par \par B/L Breast ultrasound 11/1/2021:  IMPRESSION: No sonographic evidence of malignancy. Stable hypoechoic nodules at the left 5:00 axis, 2 cm from the nipple and left 11:00 axis, 5 cm from the nipple, respectively, as above. The previously noted nodule at the left 12:00 axis, 6 cm from the nipple, is not currently visualized.   "Indeterminate nodule" within the left upper outer quadrant on screening ultrasound represents a cyst in a deep location at the left 2:00 axis, 1 cm from the nipple.  BIRADS 2 \par \par At this time patient denies breast pain, palpable masses and/or nipple discharge.  \par \par Denies personal or family history of breast or ovarian cancer. \par \par Internist/Gyn: Dr. Cosby

## 2021-11-15 NOTE — ASSESSMENT
[FreeTextEntry1] : IMP:\par -Fibrocystic changes\par -B/L Mammo/sono 10/15/2021-  No mammographic evidence of malignancy.  Submitted images for bilateral screening ultrasound are technically limited. Indeterminate nodule in the upper outer left breast.  Recommend repeat B/L breast ultrasound for further evaluation.  BIRADS 0\par -B/L Breast ultrasound 11/1/2021:  IMPRESSION: No sonographic evidence of malignancy. Stable hypoechoic nodules at the left 5:00 axis, 2 cm from the nipple and left 11:00 axis, 5 cm from the nipple, respectively, as above. The previously noted nodule at the left 12:00 axis, 6 cm from the nipple, is not currently visualized.   "Indeterminate nodule" within the left upper outer quadrant on screening ultrasound represents a cyst in a deep location at the left 2:00 axis, 1 cm from the nipple.  BIRADS 2 \par \par PLAN:\par RTO yearly with mammo/sono \par

## 2021-11-15 NOTE — PHYSICAL EXAM
[Normal] : supple, no neck mass and thyroid not enlarged [Normal Supraclavicular Lymph Nodes] : normal supraclavicular lymph nodes [Normal Axillary Lymph Nodes] : normal axillary lymph nodes [Normal] : oriented to person, place and time, with appropriate affect [de-identified] : Fibrocystic but no masses or nipple discharge bilaterally  [FreeTextEntry1] : Deferred

## 2022-06-23 DIAGNOSIS — U07.1 COVID-19: ICD-10-CM

## 2022-07-07 ENCOUNTER — RESULT REVIEW (OUTPATIENT)
Age: 62
End: 2022-07-07

## 2022-08-03 ENCOUNTER — APPOINTMENT (OUTPATIENT)
Dept: RADIOLOGY | Facility: CLINIC | Age: 62
End: 2022-08-03

## 2022-08-03 ENCOUNTER — APPOINTMENT (OUTPATIENT)
Dept: ULTRASOUND IMAGING | Facility: CLINIC | Age: 62
End: 2022-08-03

## 2022-08-03 ENCOUNTER — OUTPATIENT (OUTPATIENT)
Dept: OUTPATIENT SERVICES | Facility: HOSPITAL | Age: 62
LOS: 1 days | End: 2022-08-03
Payer: COMMERCIAL

## 2022-08-03 DIAGNOSIS — D25.9 LEIOMYOMA OF UTERUS, UNSPECIFIED: ICD-10-CM

## 2022-08-03 DIAGNOSIS — Z78.0 ASYMPTOMATIC MENOPAUSAL STATE: ICD-10-CM

## 2022-08-03 PROCEDURE — 76856 US EXAM PELVIC COMPLETE: CPT | Mod: 26

## 2022-08-03 PROCEDURE — 77080 DXA BONE DENSITY AXIAL: CPT

## 2022-08-03 PROCEDURE — 77080 DXA BONE DENSITY AXIAL: CPT | Mod: 26

## 2022-08-03 PROCEDURE — 76830 TRANSVAGINAL US NON-OB: CPT | Mod: 26

## 2022-08-03 PROCEDURE — 76856 US EXAM PELVIC COMPLETE: CPT

## 2022-08-03 PROCEDURE — 76830 TRANSVAGINAL US NON-OB: CPT

## 2022-12-30 ENCOUNTER — EMERGENCY (EMERGENCY)
Facility: HOSPITAL | Age: 62
LOS: 1 days | Discharge: HOME | End: 2022-12-30
Attending: EMERGENCY MEDICINE | Admitting: EMERGENCY MEDICINE
Payer: COMMERCIAL

## 2022-12-30 VITALS
TEMPERATURE: 97 F | OXYGEN SATURATION: 97 % | DIASTOLIC BLOOD PRESSURE: 76 MMHG | RESPIRATION RATE: 17 BRPM | SYSTOLIC BLOOD PRESSURE: 148 MMHG | HEART RATE: 78 BPM

## 2022-12-30 VITALS
WEIGHT: 139.99 LBS | RESPIRATION RATE: 16 BRPM | OXYGEN SATURATION: 97 % | HEIGHT: 63 IN | SYSTOLIC BLOOD PRESSURE: 174 MMHG | TEMPERATURE: 98 F | DIASTOLIC BLOOD PRESSURE: 84 MMHG | HEART RATE: 82 BPM

## 2022-12-30 PROCEDURE — 99284 EMERGENCY DEPT VISIT MOD MDM: CPT

## 2022-12-30 PROCEDURE — 73630 X-RAY EXAM OF FOOT: CPT | Mod: 26,LT

## 2022-12-30 PROCEDURE — 73630 X-RAY EXAM OF FOOT: CPT

## 2022-12-30 PROCEDURE — 73562 X-RAY EXAM OF KNEE 3: CPT | Mod: 26,LT

## 2022-12-30 PROCEDURE — 73610 X-RAY EXAM OF ANKLE: CPT

## 2022-12-30 PROCEDURE — 73562 X-RAY EXAM OF KNEE 3: CPT

## 2022-12-30 PROCEDURE — 73610 X-RAY EXAM OF ANKLE: CPT | Mod: 26,LT

## 2022-12-30 RX ORDER — DOCUSATE SODIUM 100 MG
2 CAPSULE ORAL
Qty: 0 | Refills: 0 | DISCHARGE

## 2022-12-30 RX ORDER — ASCORBIC ACID 60 MG
1 TABLET,CHEWABLE ORAL
Qty: 0 | Refills: 0 | DISCHARGE

## 2022-12-30 RX ORDER — IBUPROFEN 200 MG
600 TABLET ORAL ONCE
Refills: 0 | Status: DISCONTINUED | OUTPATIENT
Start: 2022-12-30 | End: 2023-01-03

## 2022-12-30 NOTE — ED PROVIDER NOTE - CLINICAL SUMMARY MEDICAL DECISION MAKING FREE TEXT BOX
Impression- s/p fall. Left knee, ankle, and foot injury. R/o fracture. left third finger skin avulsion. Plan x-ray, pain meds, wound care, and ortho followup.

## 2022-12-30 NOTE — ED ADULT NURSE NOTE - OBJECTIVE STATEMENT
63 yo female presents to the ED with complaints of right knee, right foot and right ankle pain s/p falling down the attic stairs.

## 2022-12-30 NOTE — ED PROVIDER NOTE - PATIENT PORTAL LINK FT
You can access the FollowMyHealth Patient Portal offered by [facility] by registering at the following website: [email or web address]. By joining [facility]’s FollowMyHealth portal, you will also be able to view your health information using other applications (apps) compatible with our system.

## 2022-12-30 NOTE — ED PROVIDER NOTE - CARE PROVIDER_API CALL
Cb Vivas)  Orthopedics  [address], [address]  [place] [address]  Phone: [phone]  Fax: [phone]  Follow Up Time: 1-3 Days

## 2022-12-30 NOTE — ED PROVIDER NOTE - PHYSICAL EXAMINATION
VSS.Afebrile. NAD. Head traumatic, nontender scalp. PERRL, EOMI. Neck is supple nontender, FROM intact. Chest and abdomen atraumatic. No spinal tenderness or deformity. Mild tenderness and swelling left medial knee, left medial ankle, and left foot dorsum. No bony deformity. Pulses and sensation intact. Skin- tiny avulsion left third finger palmar aspect, FROM intact. Neuro no deficits.

## 2022-12-30 NOTE — ED PROVIDER NOTE - NSFOLLOWUPINSTRUCTIONS_ED_ALL_ED_FT
Knee Sprain       A knee sprain is a stretch or tear in a knee ligament. Knee ligaments are tissues that connect bones in the knee to each other.      What are the causes?    This condition often results from:  •A fall.       •An injury to the knee.        What are the signs or symptoms?    Symptoms of this condition include:  •Trouble straightening or bending the leg.       •Swelling in the knee.       •Bruising around the knee.       •Tenderness or pain in the knee.       •Sudden muscle tightening (spasms) around the knee.        How is this treated?    Treatment for this condition may involve:  •Keeping the knee still (immobilized) with a cast, brace, or splint.      •Putting ice on the knee. This helps with pain and swelling.      •Raising (elevating) the knee above the level of your heart when you are resting. This helps with pain and swelling.      •Taking medicine for pain.       •Doing exercises to keep your knee from being weak or stiff.      •Having surgery. This may be needed if the ligament is completely torn.        Follow these instructions at home:    If you have a splint or brace:     •Wear it as told by your doctor. Remove it only as told by your doctor.      •Check the skin around it every day. Tell your doctor about any concerns.    •Loosen it if your toes:  •Tingle.      •Become numb.      •Turn cold and blue.        •Keep it clean and dry.      If you have a cast:     • Do not stick anything inside it to scratch your skin.      •Check the skin around it every day. Tell your doctor about any concerns.      •You may put lotion on dry skin around the edges of the cast. Do not put lotion on the skin under the cast.      •Keep it clean and dry.      Bathing     If you have a splint, brace, or cast that is not waterproof:  •Do not let it get wet.      •Cover it with a watertight covering when you take a bath or a shower.        Managing pain, stiffness, and swelling  •If told, put ice on the injured area. To do this:  •If you have a removable splint or brace, remove it as told by your doctor.      •Put ice in a plastic bag.      •Place a towel between your skin and the bag or between your cast and the bag.      •Leave the ice on for 20 minutes, 2–3 times a day.        •Move your toes often to reduce stiffness and swelling.      •Raise the injured area above the level of your heart while you are sitting or lying down.      General instructions    •Take over-the-counter and prescription medicines only as told by your doctor.      •Do not use any products that contain nicotine or tobacco, such as cigarettes, e-cigarettes, and chewing tobacco. These can delay healing. If you need help quitting, ask your doctor.      •Do exercises as told by your doctor.      •Keep all follow-up visits as told by your doctor. This is important.        Contact a doctor if:    •Your pain gets worse.      •The cast, brace, or splint does not fit right.      •The cast, brace, or splint gets damaged.        Get help right away if:    •You cannot use your knee to support your body weight (bear weight) for standing or walking.      •You cannot move the injured area.      •Your knee ofelia or you have pain after you walk only a few steps.      •You have very bad pain, swelling, or numbness in your leg below the cast, brace, or splint.      •Your foot or toes are numb, cold, or blue after loosening your splint or brace.        Summary    •A knee sprain is a stretch or tear in a tissue (ligament) that connects your knee bones to each other.      •You may need to wear a splint, brace, or cast to keep the knee still while it is getting better.      •Surgery may be needed if the ligament is completely torn.      This information is not intended to replace advice given to you by your health care provider. Make sure you discuss any questions you have with your health care provider.

## 2023-01-04 ENCOUNTER — APPOINTMENT (OUTPATIENT)
Dept: INTERNAL MEDICINE | Facility: CLINIC | Age: 63
End: 2023-01-04
Payer: COMMERCIAL

## 2023-01-04 ENCOUNTER — NON-APPOINTMENT (OUTPATIENT)
Age: 63
End: 2023-01-04

## 2023-01-04 VITALS
OXYGEN SATURATION: 97 % | SYSTOLIC BLOOD PRESSURE: 130 MMHG | HEIGHT: 63 IN | TEMPERATURE: 97.2 F | DIASTOLIC BLOOD PRESSURE: 70 MMHG | HEART RATE: 68 BPM | BODY MASS INDEX: 25.69 KG/M2 | WEIGHT: 145 LBS

## 2023-01-04 DIAGNOSIS — Z80.7 FAMILY HISTORY OF OTHER MALIGNANT NEOPLASMS OF LYMPHOID, HEMATOPOIETIC AND RELATED TISSUES: ICD-10-CM

## 2023-01-04 DIAGNOSIS — K64.4 RESIDUAL HEMORRHOIDAL SKIN TAGS: ICD-10-CM

## 2023-01-04 DIAGNOSIS — Z78.9 OTHER SPECIFIED HEALTH STATUS: ICD-10-CM

## 2023-01-04 PROCEDURE — 99386 PREV VISIT NEW AGE 40-64: CPT | Mod: 25

## 2023-01-04 PROCEDURE — G0444 DEPRESSION SCREEN ANNUAL: CPT

## 2023-01-04 PROCEDURE — 93000 ELECTROCARDIOGRAM COMPLETE: CPT | Mod: 59

## 2023-01-04 PROCEDURE — 36415 COLL VENOUS BLD VENIPUNCTURE: CPT

## 2023-01-04 RX ORDER — DOCUSATE SODIUM 100 MG/1
100 CAPSULE ORAL 3 TIMES DAILY
Qty: 30 | Refills: 0 | Status: ACTIVE | COMMUNITY

## 2023-01-04 RX ORDER — NIRMATRELVIR AND RITONAVIR 300-100 MG
20 X 150 MG & KIT ORAL
Qty: 30 | Refills: 0 | Status: COMPLETED | COMMUNITY
Start: 2022-06-23 | End: 2023-01-04

## 2023-01-06 ENCOUNTER — APPOINTMENT (OUTPATIENT)
Dept: ORTHOPEDIC SURGERY | Facility: CLINIC | Age: 63
End: 2023-01-06
Payer: COMMERCIAL

## 2023-01-06 ENCOUNTER — NON-APPOINTMENT (OUTPATIENT)
Age: 63
End: 2023-01-06

## 2023-01-06 ENCOUNTER — APPOINTMENT (OUTPATIENT)
Dept: INTERNAL MEDICINE | Facility: CLINIC | Age: 63
End: 2023-01-06

## 2023-01-06 PROCEDURE — 99204 OFFICE O/P NEW MOD 45 MIN: CPT

## 2023-01-06 NOTE — DISCUSSION/SUMMARY
[de-identified] : Left knee internal derangement\par \par Extensive discussion of the natural history of this issue was had with the patient.  We discussed the treatment options focusing on conservative therapy which includes anti-inflammatories, physical therapy/home exercise, & activity modification.  We discussed that she may have meniscus tear.  We discussed treatment options ranging from conservative treatment and steroid injections to surgery.  At this time since she is improving we will continue to monitor.  If she would like steroid injection she can follow-up at any time.  If the pain continues for 1 month she should follow-up for reevaluation.

## 2023-01-06 NOTE — PHYSICAL EXAM
[de-identified] : General Appearance: normal without acute distress\par Mental: Alert and oriented x 3\par Psych/affect: appropriate, cooperative\par Gait: Mildly antalgic gait\par \par Left knee\par Inspection: no effusion, no erythema.\par Wounds: [none].\par Alignment: neutral.\par Palpation: Tender to palpation [medial joint line.]\par ROM active (in degrees): 0-140, no pain with deep flexion\par Ligamentous laxity: stable to varus stress test, stable to valgus stress test. Negative Lachman's test\par Meniscal Test: [Positive] McMurrays, negative Anila.\par Muscle Test: good quad strength. [de-identified] : Left knee x-rays taken at [facility] reviewed interpreted today–no abnormalities, joint space well-preserved.

## 2023-01-06 NOTE — HISTORY OF PRESENT ILLNESS
[de-identified] : Patient presents with left knee pain.  States she fell down some attic stairs 1 week ago.  Initially she had significant pain and cannot put weight on her knee.  She was seen at [facility] but found to have no fractures.  The pain is significantly improved since then, she has no pain with standing or sitting.  Does have some pain on the medial aspect of her knee with use.  States her knee feels sore and stiff.  Takes Advil which does help.  Denies other medical issues.

## 2023-01-23 ENCOUNTER — TRANSCRIPTION ENCOUNTER (OUTPATIENT)
Age: 63
End: 2023-01-23

## 2023-01-23 NOTE — HEALTH RISK ASSESSMENT
[Never] : Never [Yes] : Yes [4 or more  times a week (4 pts)] : 4 or more  times a week (4 points) [Any fall with injury in past year] : Patient reported fall with injury in the past year [0] : 2) Feeling down, depressed, or hopeless: Not at all (0) [PHQ-2 Negative - No further assessment needed] : PHQ-2 Negative - No further assessment needed [Patient reported mammogram was normal] : Patient reported mammogram was normal [Patient reported bone density results were abnormal] : Patient reported bone density results were abnormal [Patient reported colonoscopy was normal] : Patient reported colonoscopy was normal [Patient reported PAP Smear was normal] : Patient reported PAP Smear was normal [HIV test declined] : HIV test declined [Hepatitis C test offered] : Hepatitis C test offered [None] : None [With Family] : lives with family [] :  [Sexually Active] : sexually active [Feels Safe at Home] : Feels safe at home [Fully functional (bathing, dressing, toileting, transferring, walking, feeding)] : Fully functional (bathing, dressing, toileting, transferring, walking, feeding) [Fully functional (using the telephone, shopping, preparing meals, housekeeping, doing laundry, using] : Fully functional and needs no help or supervision to perform IADLs (using the telephone, shopping, preparing meals, housekeeping, doing laundry, using transportation, managing medications and managing finances) [Very Good] : ~his/her~  mood as very good [1 or 2 (0 pts)] : 1 or 2 (0 points) [Never (0 pts)] : Never (0 points) [No] : In the past 12 months have you used drugs other than those required for medical reasons? No [Employed] : employed [de-identified] : 1 glass of wine nightly. [Audit-CScore] : 4 [[id number]] : 0 [Change in mental status noted] : No change in mental status noted [Reports changes in hearing] : Reports no changes in hearing [Reports changes in dental health] : Reports no changes in dental health [MammogramDate] : 11/21 [PapSmearDate] : 07/22 [PapSmearComments] : Dr. Cosby (retiring), will be seeing Dr. Dover. [BoneDensityDate] : 08/22 [BoneDensityComments] : 8/3/2022 - osteopenia femoral neck. [ColonoscopyDate] : 01/21 [ColonoscopyComments] : No polyps, 3 year follow-up due to family history.  Dr. Huggins. 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 [de-identified] : Contacts - near and farsighted.  [facility]. [FreeTextEntry4] : Will discuss in a future visit.

## 2023-01-23 NOTE — HISTORY OF PRESENT ILLNESS
[de-identified] : New Patient - 1st visit to [facility] Physician [facility] at [place].  Patient is a 62 year old female with a history of fibrocystic breast disease.  She had several breast biopsies.  Patient had COVID-19 in June 2022 and got Paxlovid script which she didn’t take.  She got the infection from her daughter's wedding a few days earlier.  She recovered.  She has a history of external hemorrhoids, which bleed occasionally.  She sees Dr. Huggins for this and had banding once in 2020.  She was told she may have a rectal fistula.  Patient was also previously told of mild hypothyroidism and was told to increase her iodine in her diet.\par \par Patient fell down her attic steps last Thursday 12/29/2022.  She was in the [place] ER the next day.  She was told she had a sprain of the L knee.  X-rays were negative.\par \par Patient had 4 doses of the initial Pfizer COVID-19 vaccine on 12/18/2020, 1/19/2021, 10/22/2021, 4/20/2022.  She had the bivalent Pfizer COVID-19 vaccine on 10/19/2022.  She had the influenza vaccination approx. Oct 2022.  She had a Tdap 9/23/2018.  She has not had Shingrix.\par \par Patient is  and lives with her spouse.  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

## 2023-01-23 NOTE — ASSESSMENT
[FreeTextEntry1] : HCM - discussed diet, exercise, weight maintenance.  Labs drawn in office as below.  HIV testing offered and patient declined.  Hepatitis C screen sent today.  Patient had 4 doses of the initial Pfizer COVID-19 vaccine and  also the bivalent booster.  Patient received the influenza vaccination this season.  Tdap UTD from 9/23/2018.  She has not had the Shingrix series.  Continue screening mammograms every 1-2 years, and follow-up with Dr. Banks (breast surgeon).  Continue PAP smears as per Gyn.  Bone density showed mild osteopenia, and she can resume screening at age 65.  Screening colonoscopy normal Jan 2021 with Dr. Huggins, and patient continues to go every 3 years due to her family history (both parents had colon cancer).  Will discuss Advance Directives in a future visit.\par \par ENT referral given for tinnitus symptoms.

## 2023-01-23 NOTE — DATA REVIEWED
[FreeTextEntry1] : NSR @ 60, nl axis, intervals, no ST-T changes.\par \par Vitals - /70, P 68, T 97.2F, O2 sat 97%, Ht 5'3", Wt 145lbs, BMI 25.69

## 2023-01-25 ENCOUNTER — OUTPATIENT (OUTPATIENT)
Dept: OUTPATIENT SERVICES | Facility: HOSPITAL | Age: 63
LOS: 1 days | End: 2023-01-25
Payer: COMMERCIAL

## 2023-01-25 ENCOUNTER — APPOINTMENT (OUTPATIENT)
Dept: MRI IMAGING | Facility: CLINIC | Age: 63
End: 2023-01-25
Payer: COMMERCIAL

## 2023-01-25 DIAGNOSIS — M23.92 UNSPECIFIED INTERNAL DERANGEMENT OF LEFT KNEE: ICD-10-CM

## 2023-01-25 PROCEDURE — 73721 MRI JNT OF LWR EXTRE W/O DYE: CPT

## 2023-01-25 PROCEDURE — 73721 MRI JNT OF LWR EXTRE W/O DYE: CPT | Mod: 26,LT

## 2023-02-06 ENCOUNTER — APPOINTMENT (OUTPATIENT)
Dept: ORTHOPEDIC SURGERY | Facility: CLINIC | Age: 63
End: 2023-02-06
Payer: COMMERCIAL

## 2023-02-06 DIAGNOSIS — M23.92 UNSPECIFIED INTERNAL DERANGEMENT OF LEFT KNEE: ICD-10-CM

## 2023-02-06 PROCEDURE — 99214 OFFICE O/P EST MOD 30 MIN: CPT

## 2023-02-06 NOTE — DISCUSSION/SUMMARY
[de-identified] : Left knee nondisplaced avulsion fracture/MCL sprain\par \par Extensive discussion of the natural history of this issue was had with the patient.  We discussed the treatment options focusing on conservative therapy which includes anti-inflammatories, physical therapy/home exercise, & activity modification.  Prescription meloxicam was sent to her pharmacy.  Patient declined larger knee brace.  Continue aspirin 325 once a day for DVT prophylaxis.  Recommend avoiding strenuous activity for 1 more month.  All questions answered

## 2023-02-06 NOTE — HISTORY OF PRESENT ILLNESS
[de-identified] : Patient here for follow-up for left knee pain.  States that he is feeling much better overall but she still does have pain on the medial aspect of her knee.  Taking Advil for this.  Also using a knee sleeve.  States she did go away and was able to bike 15 miles.  States it feels better when she is moving it.\par \par 1/6/2023: Patient presents with left knee pain.  States she fell down some attic stairs 1 week ago.  Initially she had significant pain and cannot put weight on her knee.  She was seen at [facility] but found to have no fractures.  The pain is significantly improved since then, she has no pain with standing or sitting.  Does have some pain on the medial aspect of her knee with use.  States her knee feels sore and stiff.  Takes Advil which does help.  Denies other medical issues.

## 2023-02-06 NOTE — PHYSICAL EXAM
[de-identified] : Left knee\par Inspection: Mild effusion, no erythema.\par Wounds: None.\par Alignment: neutral.\par Palpation: Tender to palpation medial joint line.\par ROM active (in degrees): 0-140, no pain with deep flexion\par Ligamentous laxity: stable to varus stress test, stable to valgus stress test. Negative Lachman's test\par Meniscal Test: Positive McMurrays, negative Anila.\par Muscle Test: good quad strength. [de-identified] : Left knee MRI reviewed interpreted–mild MCL sprain, small nondisplaced medial tibial plateau avulsion fracture\par \par Prior left knee x-rays taken at [facility] reviewed interpreted today–no abnormalities, joint space well-preserved.

## 2023-02-27 ENCOUNTER — RESULT REVIEW (OUTPATIENT)
Age: 63
End: 2023-02-27

## 2023-02-27 ENCOUNTER — APPOINTMENT (OUTPATIENT)
Dept: MAMMOGRAPHY | Facility: CLINIC | Age: 63
End: 2023-02-27
Payer: COMMERCIAL

## 2023-02-27 ENCOUNTER — APPOINTMENT (OUTPATIENT)
Dept: ULTRASOUND IMAGING | Facility: CLINIC | Age: 63
End: 2023-02-27
Payer: COMMERCIAL

## 2023-02-27 ENCOUNTER — OUTPATIENT (OUTPATIENT)
Dept: OUTPATIENT SERVICES | Facility: HOSPITAL | Age: 63
LOS: 1 days | End: 2023-02-27
Payer: COMMERCIAL

## 2023-02-27 DIAGNOSIS — N60.19 DIFFUSE CYSTIC MASTOPATHY OF UNSPECIFIED BREAST: ICD-10-CM

## 2023-02-27 PROCEDURE — 77067 SCR MAMMO BI INCL CAD: CPT | Mod: 26

## 2023-02-27 PROCEDURE — 77063 BREAST TOMOSYNTHESIS BI: CPT

## 2023-02-27 PROCEDURE — 76641 ULTRASOUND BREAST COMPLETE: CPT | Mod: 26,50

## 2023-02-27 PROCEDURE — 77067 SCR MAMMO BI INCL CAD: CPT

## 2023-02-27 PROCEDURE — 77063 BREAST TOMOSYNTHESIS BI: CPT | Mod: 26

## 2023-02-27 PROCEDURE — 76641 ULTRASOUND BREAST COMPLETE: CPT

## 2023-02-28 LAB
25(OH)D3 SERPL-MCNC: 29.5 NG/ML
ALBUMIN SERPL ELPH-MCNC: 4.6 G/DL
ALP BLD-CCNC: 55 U/L
ALT SERPL-CCNC: 17 U/L
ANION GAP SERPL CALC-SCNC: 11 MMOL/L
APPEARANCE: CLEAR
AST SERPL-CCNC: 19 U/L
BACTERIA: NEGATIVE
BASOPHILS # BLD AUTO: 0.03 K/UL
BASOPHILS NFR BLD AUTO: 0.6 %
BILIRUB SERPL-MCNC: 0.4 MG/DL
BILIRUBIN URINE: NEGATIVE
BLOOD URINE: ABNORMAL
BUN SERPL-MCNC: 11 MG/DL
CALCIUM SERPL-MCNC: 10 MG/DL
CHLORIDE SERPL-SCNC: 107 MMOL/L
CHOLEST SERPL-MCNC: 249 MG/DL
CO2 SERPL-SCNC: 24 MMOL/L
COLOR: COLORLESS
CREAT SERPL-MCNC: 0.75 MG/DL
EGFR: 90 ML/MIN/1.73M2
EOSINOPHIL # BLD AUTO: 0.22 K/UL
EOSINOPHIL NFR BLD AUTO: 4.4 %
ESTIMATED AVERAGE GLUCOSE: 103 MG/DL
GLUCOSE QUALITATIVE U: NEGATIVE
GLUCOSE SERPL-MCNC: 96 MG/DL
HBA1C MFR BLD HPLC: 5.2 %
HCT VFR BLD CALC: 40.7 %
HCV AB SER QL: NONREACTIVE
HCV S/CO RATIO: 0.13 S/CO
HDLC SERPL-MCNC: 119 MG/DL
HGB BLD-MCNC: 13.4 G/DL
HYALINE CASTS: 0 /LPF
IMM GRANULOCYTES NFR BLD AUTO: 0 %
KETONES URINE: NEGATIVE
LDLC SERPL CALC-MCNC: 119 MG/DL
LEUKOCYTE ESTERASE URINE: NEGATIVE
LYMPHOCYTES # BLD AUTO: 1.33 K/UL
LYMPHOCYTES NFR BLD AUTO: 26.4 %
MAN DIFF?: NORMAL
MCHC RBC-ENTMCNC: 31.8 PG
MCHC RBC-ENTMCNC: 32.9 GM/DL
MCV RBC AUTO: 96.4 FL
MICROSCOPIC-UA: NORMAL
MONOCYTES # BLD AUTO: 0.4 K/UL
MONOCYTES NFR BLD AUTO: 7.9 %
NEUTROPHILS # BLD AUTO: 3.06 K/UL
NEUTROPHILS NFR BLD AUTO: 60.7 %
NITRITE URINE: NEGATIVE
NONHDLC SERPL-MCNC: 131 MG/DL
PH URINE: 7.5
PLATELET # BLD AUTO: 229 K/UL
POTASSIUM SERPL-SCNC: 4.3 MMOL/L
PROT SERPL-MCNC: 6.8 G/DL
PROTEIN URINE: NEGATIVE
RBC # BLD: 4.22 M/UL
RBC # FLD: 13.4 %
RED BLOOD CELLS URINE: 0 /HPF
SODIUM SERPL-SCNC: 142 MMOL/L
SPECIFIC GRAVITY URINE: 1
SQUAMOUS EPITHELIAL CELLS: 0 /HPF
T4 FREE SERPL-MCNC: 1 NG/DL
TRIGL SERPL-MCNC: 60 MG/DL
TSH SERPL-ACNC: 3.46 UIU/ML
UROBILINOGEN URINE: NORMAL
VIT B12 SERPL-MCNC: 524 PG/ML
WBC # FLD AUTO: 5.04 K/UL
WHITE BLOOD CELLS URINE: 0 /HPF

## 2023-03-15 ENCOUNTER — RESULT CHARGE (OUTPATIENT)
Age: 63
End: 2023-03-15

## 2023-03-15 ENCOUNTER — APPOINTMENT (OUTPATIENT)
Dept: OBGYN | Facility: CLINIC | Age: 63
End: 2023-03-15
Payer: COMMERCIAL

## 2023-03-15 VITALS — DIASTOLIC BLOOD PRESSURE: 93 MMHG | SYSTOLIC BLOOD PRESSURE: 150 MMHG

## 2023-03-15 LAB
BILIRUB UR QL STRIP: NORMAL
CLARITY UR: CLEAR
COLLECTION METHOD: NORMAL
DATE COLLECTED: NORMAL
GLUCOSE UR-MCNC: NORMAL
HCG UR QL: 0.2 EU/DL
HEMOCCULT SP1 STL QL: NEGATIVE
HEMOGLOBIN: 13
HGB UR QL STRIP.AUTO: NORMAL
KETONES UR-MCNC: NORMAL
LEUKOCYTE ESTERASE UR QL STRIP: NORMAL
NITRITE UR QL STRIP: NORMAL
PH UR STRIP: 6.5
PROT UR STRIP-MCNC: NORMAL
QUALITY CONTROL: YES
SP GR UR STRIP: 1.01

## 2023-03-15 PROCEDURE — 99386 PREV VISIT NEW AGE 40-64: CPT

## 2023-03-15 PROCEDURE — 82270 OCCULT BLOOD FECES: CPT

## 2023-03-15 PROCEDURE — 81003 URINALYSIS AUTO W/O SCOPE: CPT | Mod: QW

## 2023-03-15 PROCEDURE — 85018 HEMOGLOBIN: CPT | Mod: QW

## 2023-03-16 ENCOUNTER — APPOINTMENT (OUTPATIENT)
Dept: INTERNAL MEDICINE | Facility: CLINIC | Age: 63
End: 2023-03-16
Payer: COMMERCIAL

## 2023-03-16 VITALS
HEART RATE: 82 BPM | WEIGHT: 142 LBS | BODY MASS INDEX: 25.16 KG/M2 | DIASTOLIC BLOOD PRESSURE: 92 MMHG | SYSTOLIC BLOOD PRESSURE: 150 MMHG | HEIGHT: 63 IN | OXYGEN SATURATION: 98 % | TEMPERATURE: 97.2 F

## 2023-03-16 PROCEDURE — 99213 OFFICE O/P EST LOW 20 MIN: CPT | Mod: 25

## 2023-03-16 NOTE — PLAN
[FreeTextEntry1] : \par \par Patient to follow up in 1 year for annual GYN exam\par Mammogram and bilateral breast US due:  2/24 Rx given \par Colonoscopy due:\par Bone density due:  10/24 \par \par Patient to follow up with PMD regarding BP, she verbalizes understanding. \par \par Pap ordered\par Hemoccult ordered \par All questions answered, patient agreeable with plan.\par \par \par \par ARLETTE MURILLO am scribing for the presence of Dr. Dover the following sections HISTORY OF PRESENT ILLNESS, PAST MEDICAL/FAMILY/SOCIAL HISTORY; REVIEW OF SYSTEMS; VITAL SIGNS; PHYSICAL EXAM; DISPOSITION. \par I personally performed the services described in the documentation, reviewed the documentation recorded by the scribe in my presence and it accurately and completely records my words and actions.\par

## 2023-03-16 NOTE — PHYSICAL EXAM
[No Acute Distress] : no acute distress [Well Nourished] : well nourished [Well Developed] : well developed [Well-Appearing] : well-appearing [Normal Sclera/Conjunctiva] : normal sclera/conjunctiva [PERRL] : pupils equal round and reactive to light [EOMI] : extraocular movements intact [Normal Outer Ear/Nose] : the outer ears and nose were normal in appearance [Normal Oropharynx] : the oropharynx was normal [No JVD] : no jugular venous distention [No Lymphadenopathy] : no lymphadenopathy [Supple] : supple [Thyroid Normal, No Nodules] : the thyroid was normal and there were no nodules present [No Accessory Muscle Use] : no accessory muscle use [No Respiratory Distress] : no respiratory distress  [Clear to Auscultation] : lungs were clear to auscultation bilaterally [Normal Rate] : normal rate  [Regular Rhythm] : with a regular rhythm [No Murmur] : no murmur heard [Normal S1, S2] : normal S1 and S2 [No Carotid Bruits] : no carotid bruits [No Abdominal Bruit] : a ~M bruit was not heard ~T in the abdomen [No Varicosities] : no varicosities [Pedal Pulses Present] : the pedal pulses are present [No Edema] : there was no peripheral edema [No Palpable Aorta] : no palpable aorta [No Extremity Clubbing/Cyanosis] : no extremity clubbing/cyanosis [Soft] : abdomen soft [Non Tender] : non-tender [Non-distended] : non-distended [No Masses] : no abdominal mass palpated [No HSM] : no HSM [Normal Bowel Sounds] : normal bowel sounds [Normal Posterior Cervical Nodes] : no posterior cervical lymphadenopathy [Normal Anterior Cervical Nodes] : no anterior cervical lymphadenopathy [No CVA Tenderness] : no CVA  tenderness [No Spinal Tenderness] : no spinal tenderness [No Joint Swelling] : no joint swelling [Grossly Normal Strength/Tone] : grossly normal strength/tone [No Rash] : no rash [Coordination Grossly Intact] : coordination grossly intact [No Focal Deficits] : no focal deficits [Normal Gait] : normal gait [Deep Tendon Reflexes (DTR)] : deep tendon reflexes were 2+ and symmetric [Normal Affect] : the affect was normal [Normal Insight/Judgement] : insight and judgment were intact

## 2023-03-16 NOTE — HISTORY OF PRESENT ILLNESS
[FreeTextEntry1] : Patient is a 62 yo female here today for annual visit and to establish care. \par She is a patient of dr. barrios who just retired. \par \par  2 vaginal deliveries\par she has hx of a d&c in the past\par \par last mammo/last breast US:  NORMAL\par Last bone density: aug 2022. \par last colonoscopy:  normal mother past of colon CA \par \par \par Last bone density \par  Spine: -0.6, normal; previously -0.3.\par Femoral neck: -1.5 , osteopenia; previously -1.1.\par Total hip: -0.8 , normal; previously -0.7.\par \par IMPRESSION: Osteopenia.\par \par FRACTURE RISK: Using the FRAX 10 year fracture risk calculator the \par patient's ten-year risk of any fracture is 8.7% and the patient's risk of \par  hip fracture  is  0.8%. Additional risk factors used in the FRAX \par calculation: None.

## 2023-03-16 NOTE — HISTORY OF PRESENT ILLNESS
[FreeTextEntry1] : Follow up for BP check [de-identified] : Ms. SABILLON is a 63 year old female who presents to the office for follow up on blood pressure. The patient was found to have elevated BP during a routine visit w/ OB/GYN. 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 The patient has no complaints at this time - no headache, vision changes or dizziness. She has been following a healthy diet (no significant sodium intake) and has lost 6 pounds recently. The patient has never taken BP medication in the past. She has no other complaints today.

## 2023-03-16 NOTE — PHYSICAL EXAM
[Chaperone Present] : A chaperone was present in the examining room during all aspects of the physical examination [Appropriately responsive] : appropriately responsive [Alert] : alert [No Acute Distress] : no acute distress [No Lymphadenopathy] : no lymphadenopathy [Soft] : soft [Non-tender] : non-tender [Non-distended] : non-distended [No HSM] : No HSM [No Lesions] : no lesions [No Mass] : no mass [Oriented x3] : oriented x3 [Examination Of The Breasts] : a normal appearance [No Discharge] : no discharge [No Masses] : no breast masses were palpable [Labia Majora] : normal [Labia Minora] : normal [Normal] : normal [Uterine Adnexae] : normal [Normal rectal exam] : was normal [FreeTextEntry1] : Jodi PAIZ-RADHA chaperoned during entire physical exam [Occult Blood Positive] : was negative for occult blood analysis

## 2023-03-20 LAB — CYTOLOGY CVX/VAG DOC THIN PREP: ABNORMAL

## 2023-03-30 ENCOUNTER — NON-APPOINTMENT (OUTPATIENT)
Age: 63
End: 2023-03-30

## 2023-03-31 ENCOUNTER — APPOINTMENT (OUTPATIENT)
Dept: INFECTIOUS DISEASE | Facility: CLINIC | Age: 63
End: 2023-03-31
Payer: COMMERCIAL

## 2023-03-31 DIAGNOSIS — Z71.84 ENC FOR HEALTH COUNSELING RELATED TO TRAVEL: ICD-10-CM

## 2023-03-31 PROCEDURE — 99072 ADDL SUPL MATRL&STAF TM PHE: CPT

## 2023-03-31 PROCEDURE — 90471 IMMUNIZATION ADMIN: CPT

## 2023-03-31 PROCEDURE — 99401 PREV MED CNSL INDIV APPRX 15: CPT | Mod: 25

## 2023-03-31 PROCEDURE — 90713 POLIOVIRUS IPV SC/IM: CPT

## 2023-04-10 ENCOUNTER — APPOINTMENT (OUTPATIENT)
Dept: OTOLARYNGOLOGY | Facility: CLINIC | Age: 63
End: 2023-04-10
Payer: COMMERCIAL

## 2023-04-10 ENCOUNTER — NON-APPOINTMENT (OUTPATIENT)
Age: 63
End: 2023-04-10

## 2023-04-10 VITALS
BODY MASS INDEX: 24.1 KG/M2 | WEIGHT: 136 LBS | HEART RATE: 82 BPM | DIASTOLIC BLOOD PRESSURE: 78 MMHG | SYSTOLIC BLOOD PRESSURE: 124 MMHG | HEIGHT: 63 IN

## 2023-04-10 DIAGNOSIS — H93.19 TINNITUS, UNSPECIFIED EAR: ICD-10-CM

## 2023-04-10 DIAGNOSIS — H93.293 OTHER ABNORMAL AUDITORY PERCEPTIONS, BILATERAL: ICD-10-CM

## 2023-04-10 PROCEDURE — 92625 TINNITUS ASSESSMENT: CPT

## 2023-04-10 PROCEDURE — 99203 OFFICE O/P NEW LOW 30 MIN: CPT | Mod: 25

## 2023-04-10 PROCEDURE — 92557 COMPREHENSIVE HEARING TEST: CPT

## 2023-04-10 PROCEDURE — 92567 TYMPANOMETRY: CPT

## 2023-04-10 NOTE — DATA REVIEWED
[de-identified] : type Ad tymps AU\par hearing within/bordering normal 250-8000 Hz AU\par tinnitus pitch match at 4khz @35 dB HL

## 2023-04-10 NOTE — HISTORY OF PRESENT ILLNESS
[de-identified] : 64 y/o F presents for initial evaluation for bilateral tinnitus\par reports tinnitus started 6 months ago; denies increased stress\par Patient denies otalgia, otorrhea, ear infections, hearing loss, dizziness, vertigo, headaches related to hearing.

## 2023-04-10 NOTE — ASSESSMENT
[FreeTextEntry1] : The association between hearing loss and tinnitus was discussed with the patient.  The manner in which tinnitus can be worsened by stress, anxiety, lack of sleep, and caffeine was discussed.  Masking and habituation strategies were discussed.  Use of sound therapies such as noise generators were discussed.  Tinnitus handout was provided.  Recommend trial of notched sound therapy, follow-up if tinnitus becomes persistently bilateral, pulsatile, or patient notices sudden change in hearing.

## 2023-04-10 NOTE — REVIEW OF SYSTEMS
[Ear Noises] : ear noises [Joint Pain] : joint pain [Negative] : Heme/Lymph [FreeTextEntry1] : High blood presure

## 2023-04-10 NOTE — CONSULT LETTER
[Consult Letter:] : I had the pleasure of evaluating your patient, [unfilled]. [Please see my note below.] : Please see my note below. [Consult Closing:] : Thank you very much for allowing me to participate in the care of this patient.  If you have any questions, please do not hesitate to contact me. [FreeTextEntry2] : Charly Baron MD

## 2023-04-17 ENCOUNTER — APPOINTMENT (OUTPATIENT)
Dept: INTERNAL MEDICINE | Facility: CLINIC | Age: 63
End: 2023-04-17
Payer: COMMERCIAL

## 2023-04-17 VITALS — SYSTOLIC BLOOD PRESSURE: 152 MMHG | DIASTOLIC BLOOD PRESSURE: 88 MMHG

## 2023-04-17 VITALS
WEIGHT: 139.9 LBS | OXYGEN SATURATION: 98 % | HEIGHT: 63 IN | HEART RATE: 73 BPM | BODY MASS INDEX: 24.79 KG/M2 | DIASTOLIC BLOOD PRESSURE: 90 MMHG | SYSTOLIC BLOOD PRESSURE: 148 MMHG | TEMPERATURE: 97.8 F

## 2023-04-17 PROCEDURE — 36415 COLL VENOUS BLD VENIPUNCTURE: CPT

## 2023-04-17 PROCEDURE — 99214 OFFICE O/P EST MOD 30 MIN: CPT | Mod: 25

## 2023-04-17 RX ORDER — CHLORHEXIDINE GLUCONATE, 0.12% ORAL RINSE 1.2 MG/ML
0.12 SOLUTION DENTAL
Qty: 473 | Refills: 0 | Status: COMPLETED | COMMUNITY
Start: 2021-07-30 | End: 2023-04-17

## 2023-04-17 RX ORDER — AZITHROMYCIN 250 MG/1
250 TABLET, FILM COATED ORAL
Qty: 4 | Refills: 0 | Status: COMPLETED | COMMUNITY
Start: 2023-03-31 | End: 2023-04-17

## 2023-04-17 RX ORDER — MELOXICAM 15 MG/1
15 TABLET ORAL DAILY
Qty: 30 | Refills: 0 | Status: COMPLETED | COMMUNITY
Start: 2023-02-06 | End: 2023-04-17

## 2023-04-17 NOTE — HISTORY OF PRESENT ILLNESS
[de-identified] : Patient for follow-up of HTN.  Patient is on lisinopril 10mg daily and tolerating it well.  No dizziness, lightheadedness, cough.  Patient is now off the meloxicam since early March 2023.  BP is 120s/80s on average, and she is keeping a log.\par \par Patient fasting and wants LDL repeated.

## 2023-04-17 NOTE — ASSESSMENT
[FreeTextEntry1] : Patient with hypertension, and tolerating the lisinopril.  BP remains high today.  Will raise the lisinopril to 20mg, and patient should continue to check her BP regularly.  She is to watch for dizziness, lightheadedness, cough, mouth/tongue swelling.  She is going to [place] in about a week or two.  She may reduce the medication back to 10mg if needed during her trip if the SBPs are persistently <100mm Hg (and especially if she feels dizzy).\par \par Lipids repeated with CMP.

## 2023-05-02 NOTE — ED PROVIDER NOTE - CARE PLAN
"Daily Note     Today's date: 2023  Patient name: Janis Real  : 1952  MRN: [id number]  Referring provider: MARCELINA Hammond*  Dx:   Encounter Diagnosis     ICD-10-CM    1  Osteoarthritis of left hip, unspecified osteoarthritis type  M16 12       2  S/P total left hip arthroplasty  Z96 642           Start Time: 1100  Stop Time: 1146  Total time in clinic (min): 46 minutes    Subjective: Pt reports her L leg has been feeling stronger and has been having less pain and less grinding in her L knee  Objective: See treatment diary below      Assessment: Tolerated treatment well  Patient demonstrated fatigue post treatment, exhibited good technique with therapeutic exercises and would benefit from continued PT  Pt continues to demonstrate improved L quadriceps strength and able to increase repetitions for sit to stands and increase step height for stepping exercises  Good tolerance to progression and no aggravation of L knee sx  Plan: Continue per plan of care  Progress treatment as tolerated         Precautions: R hip anterior CAROLIN 10/5    Manuals 3/23 3/30 4/4 4/11 5/2        PROM L Hip (flex; ER within precautions)             L hamstring stretching             Knee extension isometric  3x10 3\" NP          Neuro Re-Ed             Pt education about HEP             Quad Sets  On E-stim On E-stim On E-stim On E-stim On E-stim        Standing SLR  3x10 3x10 3x10 3x10        Bridges c bolster squeeze 3x10 3x10 3x10 3x10 3x10        Prone TKE             SAQ attempted on E-stim  attempted on E-stim          Supine SLR  1x10 pain 4x5  6x5 3x10        Ther Ex             LAQ eccentric 2x10 active            Nustep 7' lvl 3  7' lvl 3  7' lvl 3  7' lvl 3  7' lvl 3         Prone Quad stretch  5x20\" 5x20\" 5x20\" 5x20\"        TB HS curls             Dock kicks 3x10 pain 3x10          SL leg press 3x20 45# 3x20 45-55# 3x20 45# seat 8 3x20   45# seat 8 3x20   45# seat 8        Squats at bar             Ther " "Activity             Forward step ups 3x10 4\" 3x10 4\" 3x10 4\" 3x10 4\" 3x10 6\"        Lateral step ups 3x10   4\" 3x10 4\" 3x10 4\" 3x10 4\" 3x10 6\"        Sit to stands 2x10 c airex 2x10 s airex 2x10 s airex 2x10 c airex 3x10 c airex        Gait Training                                       Modalities             NMES/Russain stim L Quad 10'  50% duty cycle, 10/20  21 intensity (neuro re-ed) L Quad 10'  50% duty cycle, 10/20  21 intensity (neuro re-ed) L Quad 10'  50% duty cycle, 10/20  21 intensity (neuro re-ed) L Quad 10'  50% duty cycle, 10/20  21 intensity (neuro re-ed) L Quad 10'  50% duty cycle, 10/20  21 intensity (neuro re-ed)                              " 1 Principal Discharge DX:	Fall  Secondary Diagnosis:	Knee sprain  Secondary Diagnosis:	Ankle sprain  Secondary Diagnosis:	Foot sprain

## 2023-05-09 ENCOUNTER — RX RENEWAL (OUTPATIENT)
Age: 63
End: 2023-05-09

## 2023-05-12 ENCOUNTER — TRANSCRIPTION ENCOUNTER (OUTPATIENT)
Age: 63
End: 2023-05-12

## 2023-05-23 ENCOUNTER — APPOINTMENT (OUTPATIENT)
Dept: INTERNAL MEDICINE | Facility: CLINIC | Age: 63
End: 2023-05-23
Payer: COMMERCIAL

## 2023-05-23 VITALS
OXYGEN SATURATION: 99 % | WEIGHT: 136 LBS | DIASTOLIC BLOOD PRESSURE: 82 MMHG | HEART RATE: 67 BPM | TEMPERATURE: 97.3 F | BODY MASS INDEX: 24.1 KG/M2 | SYSTOLIC BLOOD PRESSURE: 120 MMHG | HEIGHT: 63 IN

## 2023-05-23 VITALS — SYSTOLIC BLOOD PRESSURE: 115 MMHG | DIASTOLIC BLOOD PRESSURE: 75 MMHG

## 2023-05-23 DIAGNOSIS — E83.52 HYPERCALCEMIA: ICD-10-CM

## 2023-05-23 LAB
ALBUMIN SERPL ELPH-MCNC: 4.8 G/DL
ALP BLD-CCNC: 59 U/L
ALT SERPL-CCNC: 19 U/L
ANION GAP SERPL CALC-SCNC: 12 MMOL/L
ANION GAP SERPL CALC-SCNC: 13 MMOL/L
AST SERPL-CCNC: 19 U/L
BILIRUB SERPL-MCNC: 0.5 MG/DL
BUN SERPL-MCNC: 11 MG/DL
BUN SERPL-MCNC: 12 MG/DL
CALCIUM SERPL-MCNC: 10.5 MG/DL
CALCIUM SERPL-MCNC: 10.5 MG/DL
CALCIUM SERPL-MCNC: 10.7 MG/DL
CHLORIDE SERPL-SCNC: 101 MMOL/L
CHLORIDE SERPL-SCNC: 103 MMOL/L
CHOLEST SERPL-MCNC: 227 MG/DL
CO2 SERPL-SCNC: 26 MMOL/L
CO2 SERPL-SCNC: 26 MMOL/L
CREAT SERPL-MCNC: 0.74 MG/DL
CREAT SERPL-MCNC: 0.79 MG/DL
EGFR: 84 ML/MIN/1.73M2
EGFR: 91 ML/MIN/1.73M2
GLUCOSE SERPL-MCNC: 87 MG/DL
GLUCOSE SERPL-MCNC: 92 MG/DL
HDLC SERPL-MCNC: 111 MG/DL
LDLC SERPL CALC-MCNC: 101 MG/DL
LDLC SERPL DIRECT ASSAY-MCNC: 107 MG/DL
NONHDLC SERPL-MCNC: 116 MG/DL
PARATHYROID HORMONE INTACT: 25 PG/ML
POTASSIUM SERPL-SCNC: 4.4 MMOL/L
POTASSIUM SERPL-SCNC: 4.6 MMOL/L
PROT SERPL-MCNC: 7 G/DL
SODIUM SERPL-SCNC: 140 MMOL/L
SODIUM SERPL-SCNC: 141 MMOL/L
TRIGL SERPL-MCNC: 78 MG/DL

## 2023-05-23 PROCEDURE — 99214 OFFICE O/P EST MOD 30 MIN: CPT | Mod: 25

## 2023-05-23 PROCEDURE — 36415 COLL VENOUS BLD VENIPUNCTURE: CPT

## 2023-05-23 RX ORDER — LISINOPRIL 20 MG/1
20 TABLET ORAL
Qty: 30 | Refills: 1 | Status: COMPLETED | COMMUNITY
Start: 2023-03-16 | End: 2023-05-23

## 2023-05-23 NOTE — HISTORY OF PRESENT ILLNESS
[de-identified] : Patient for follow-up of BP.  Patient has been taking the lisinopril 10mg since her last visit.  Her BPs have been mostly in the normal range when she checks on her own.  No headaches, dizziness, lightheadedness, chest pain.

## 2023-05-23 NOTE — ASSESSMENT
[FreeTextEntry1] : (1) Hypertension - patient now normotensive on lisinopril 10mg daily.  She can continue this dose.\par (2) Hyperlipidemia - patient's LDL mildly improved on the last check.  Continue current diet.\par (3) Elevated calcium - patient says she was on calcium pills previously, but now has not been taking them since her recent trip to [place].  Will repeat the calcium with a PTH level.\par \par Labs drawn in office as below.

## 2023-05-23 NOTE — PHYSICAL EXAM
[Normal] : normal rate, regular rhythm, normal S1 and S2 and no murmur heard [No Edema] : there was no peripheral edema [Soft] : abdomen soft [Non Tender] : non-tender [Non-distended] : non-distended [No Masses] : no abdominal mass palpated [Normal Bowel Sounds] : normal bowel sounds

## 2023-05-24 ENCOUNTER — TRANSCRIPTION ENCOUNTER (OUTPATIENT)
Age: 63
End: 2023-05-24

## 2023-06-29 ENCOUNTER — NON-APPOINTMENT (OUTPATIENT)
Age: 63
End: 2023-06-29

## 2023-11-03 ENCOUNTER — RX RENEWAL (OUTPATIENT)
Age: 63
End: 2023-11-03

## 2023-12-04 ENCOUNTER — APPOINTMENT (OUTPATIENT)
Dept: DERMATOLOGY | Facility: CLINIC | Age: 63
End: 2023-12-04

## 2024-01-03 ENCOUNTER — NON-APPOINTMENT (OUTPATIENT)
Age: 64
End: 2024-01-03

## 2024-01-03 ENCOUNTER — APPOINTMENT (OUTPATIENT)
Dept: INTERNAL MEDICINE | Facility: CLINIC | Age: 64
End: 2024-01-03
Payer: COMMERCIAL

## 2024-01-03 VITALS
TEMPERATURE: 96.4 F | HEART RATE: 64 BPM | OXYGEN SATURATION: 98 % | HEIGHT: 63 IN | BODY MASS INDEX: 23.92 KG/M2 | WEIGHT: 135 LBS | SYSTOLIC BLOOD PRESSURE: 120 MMHG | DIASTOLIC BLOOD PRESSURE: 80 MMHG

## 2024-01-03 DIAGNOSIS — E78.5 HYPERLIPIDEMIA, UNSPECIFIED: ICD-10-CM

## 2024-01-03 DIAGNOSIS — Z23 ENCOUNTER FOR IMMUNIZATION: ICD-10-CM

## 2024-01-03 DIAGNOSIS — N60.19 DIFFUSE CYSTIC MASTOPATHY OF UNSPECIFIED BREAST: ICD-10-CM

## 2024-01-03 DIAGNOSIS — J30.2 OTHER SEASONAL ALLERGIC RHINITIS: ICD-10-CM

## 2024-01-03 LAB
25(OH)D3 SERPL-MCNC: 23.5 NG/ML
ALBUMIN SERPL ELPH-MCNC: 4.9 G/DL
ALP BLD-CCNC: 51 U/L
ALT SERPL-CCNC: 10 U/L
ANION GAP SERPL CALC-SCNC: 11 MMOL/L
APPEARANCE: CLEAR
AST SERPL-CCNC: 16 U/L
BACTERIA: NEGATIVE /HPF
BASOPHILS # BLD AUTO: 0.05 K/UL
BASOPHILS NFR BLD AUTO: 1.2 %
BILIRUB SERPL-MCNC: 0.4 MG/DL
BILIRUBIN URINE: NEGATIVE
BLOOD URINE: NEGATIVE
BUN SERPL-MCNC: 13 MG/DL
CALCIUM SERPL-MCNC: 10.1 MG/DL
CAST: 0 /LPF
CHLORIDE SERPL-SCNC: 107 MMOL/L
CHOLEST SERPL-MCNC: 238 MG/DL
CO2 SERPL-SCNC: 26 MMOL/L
COLOR: YELLOW
CREAT SERPL-MCNC: 0.83 MG/DL
EGFR: 79 ML/MIN/1.73M2
EOSINOPHIL # BLD AUTO: 0.16 K/UL
EOSINOPHIL NFR BLD AUTO: 3.8 %
EPITHELIAL CELLS: 0 /HPF
ESTIMATED AVERAGE GLUCOSE: 108 MG/DL
GLUCOSE QUALITATIVE U: NEGATIVE MG/DL
GLUCOSE SERPL-MCNC: 97 MG/DL
HBA1C MFR BLD HPLC: 5.4 %
HCT VFR BLD CALC: 41.5 %
HDLC SERPL-MCNC: 115 MG/DL
HGB BLD-MCNC: 13.2 G/DL
IMM GRANULOCYTES NFR BLD AUTO: 0 %
KETONES URINE: NEGATIVE MG/DL
LDLC SERPL CALC-MCNC: 114 MG/DL
LEUKOCYTE ESTERASE URINE: ABNORMAL
LYMPHOCYTES # BLD AUTO: 1.37 K/UL
LYMPHOCYTES NFR BLD AUTO: 32.5 %
MAN DIFF?: NORMAL
MCHC RBC-ENTMCNC: 30.1 PG
MCHC RBC-ENTMCNC: 31.8 GM/DL
MCV RBC AUTO: 94.5 FL
MICROSCOPIC-UA: NORMAL
MONOCYTES # BLD AUTO: 0.26 K/UL
MONOCYTES NFR BLD AUTO: 6.2 %
NEUTROPHILS # BLD AUTO: 2.38 K/UL
NEUTROPHILS NFR BLD AUTO: 56.3 %
NITRITE URINE: NEGATIVE
NONHDLC SERPL-MCNC: 123 MG/DL
PH URINE: 6.5
PLATELET # BLD AUTO: 224 K/UL
POTASSIUM SERPL-SCNC: 5.3 MMOL/L
PROT SERPL-MCNC: 7 G/DL
PROTEIN URINE: NEGATIVE MG/DL
RBC # BLD: 4.39 M/UL
RBC # FLD: 12.9 %
RED BLOOD CELLS URINE: 0 /HPF
SODIUM SERPL-SCNC: 143 MMOL/L
SPECIFIC GRAVITY URINE: 1.01
T4 FREE SERPL-MCNC: 1 NG/DL
TRIGL SERPL-MCNC: 52 MG/DL
TSH SERPL-ACNC: 2.66 UIU/ML
UROBILINOGEN URINE: 0.2 MG/DL
VIT B12 SERPL-MCNC: 461 PG/ML
WBC # FLD AUTO: 4.22 K/UL
WHITE BLOOD CELLS URINE: 0 /HPF

## 2024-01-03 PROCEDURE — 90750 HZV VACC RECOMBINANT IM: CPT

## 2024-01-03 PROCEDURE — 36415 COLL VENOUS BLD VENIPUNCTURE: CPT

## 2024-01-03 PROCEDURE — 93000 ELECTROCARDIOGRAM COMPLETE: CPT

## 2024-01-03 PROCEDURE — 99396 PREV VISIT EST AGE 40-64: CPT | Mod: 25

## 2024-01-03 PROCEDURE — 90471 IMMUNIZATION ADMIN: CPT

## 2024-01-03 RX ORDER — ASCORBIC ACID 500 MG
500 TABLET ORAL
Refills: 0 | Status: ACTIVE | COMMUNITY

## 2024-01-03 RX ORDER — LORATADINE 5 MG/5 ML
10 SOLUTION, ORAL ORAL
Refills: 0 | Status: ACTIVE | COMMUNITY

## 2024-01-03 NOTE — HISTORY OF PRESENT ILLNESS
[de-identified] : Patient presents for a follow-up annual physical.  Patient is a 63-year-old female with a history of HTN, fibrocystic breast disease.  She had several breast biopsies.  Patient had COVID-19 in June 2022 and got Paxlovid script which she didn't take.  She got the infection from her daughter's wedding a few days earlier.  She recovered.  She has a history of external hemorrhoids, which bleed occasionally.  She sees Dr. Huggins for this and had banding once in 2020.  She was told she may have a rectal fissure.  She has been bleeding with bowel movements and will be returning to Dr. Huggins in 2024.  Patient was also previously told of mild hypothyroidism and was told to increase her iodine in her diet.  Patient fell down her attic steps Thursday 12/29/2022.  She was in the [place] ER the next day.  She was told she had a sprain of the L knee.  X-rays were negative.  Later an MRI showed a tibia fracture.  She saw Dr. Charly Chaidez (Ortho).  Management was non-operative.  Patient is considering the [id number] COVID-19 booster.  She had the influenza vaccination approx. Oct 2023.  She had a Tdap 9/23/2018.  She has not had Shingrix.  Patient is  and lives with her spouse.  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

## 2024-01-03 NOTE — PHYSICAL EXAM
[No Acute Distress] : no acute distress [Well Nourished] : well nourished [Well Developed] : well developed [Well-Appearing] : well-appearing [Normal Voice/Communication] : normal voice/communication [Normal Sclera/Conjunctiva] : normal sclera/conjunctiva [PERRL] : pupils equal round and reactive to light [EOMI] : extraocular movements intact [Normal Outer Ear/Nose] : the outer ears and nose were normal in appearance [Normal Oropharynx] : the oropharynx was normal [Normal TMs] : both tympanic membranes were normal [No JVD] : no jugular venous distention [No Lymphadenopathy] : no lymphadenopathy [Supple] : supple [Thyroid Normal, No Nodules] : the thyroid was normal and there were no nodules present [No Respiratory Distress] : no respiratory distress  [No Accessory Muscle Use] : no accessory muscle use [Clear to Auscultation] : lungs were clear to auscultation bilaterally [Normal Rate] : normal rate  [Regular Rhythm] : with a regular rhythm [Normal S1, S2] : normal S1 and S2 [No Murmur] : no murmur heard [No Carotid Bruits] : no carotid bruits [No Abdominal Bruit] : a ~M bruit was not heard ~T in the abdomen [No Varicosities] : no varicosities [Pedal Pulses Present] : the pedal pulses are present [No Edema] : there was no peripheral edema [No Palpable Aorta] : no palpable aorta [No Extremity Clubbing/Cyanosis] : no extremity clubbing/cyanosis [Normal Appearance] : normal in appearance [No Nipple Discharge] : no nipple discharge [No Axillary Lymphadenopathy] : no axillary lymphadenopathy [Soft] : abdomen soft [Non Tender] : non-tender [Non-distended] : non-distended [No Masses] : no abdominal mass palpated [No HSM] : no HSM [Normal Bowel Sounds] : normal bowel sounds [No CVA Tenderness] : no CVA  tenderness [No Spinal Tenderness] : no spinal tenderness [No Joint Swelling] : no joint swelling [Grossly Normal Strength/Tone] : grossly normal strength/tone [No Rash] : no rash [Coordination Grossly Intact] : coordination grossly intact [No Focal Deficits] : no focal deficits [Normal Gait] : normal gait [Deep Tendon Reflexes (DTR)] : deep tendon reflexes were 2+ and symmetric [Normal Affect] : the affect was normal [Normal Insight/Judgement] : insight and judgment were intact

## 2024-01-03 NOTE — HEALTH RISK ASSESSMENT
[Yes] : Yes [1 or 2 (0 pts)] : 1 or 2 (0 points) [Never (0 pts)] : Never (0 points) [No] : In the past 12 months have you used drugs other than those required for medical reasons? No [Any fall with injury in past year] : Patient reported fall with injury in the past year [0] : 2) Feeling down, depressed, or hopeless: Not at all (0) [PHQ-2 Negative - No further assessment needed] : PHQ-2 Negative - No further assessment needed [Patient reported mammogram was normal] : Patient reported mammogram was normal [Patient reported PAP Smear was normal] : Patient reported PAP Smear was normal [Patient reported bone density results were abnormal] : Patient reported bone density results were abnormal [Patient reported colonoscopy was normal] : Patient reported colonoscopy was normal [HIV test declined] : HIV test declined [None] : None [With Family] : lives with family [Employed] : employed [] :  [Sexually Active] : sexually active [Feels Safe at Home] : Feels safe at home [Fully functional (bathing, dressing, toileting, transferring, walking, feeding)] : Fully functional (bathing, dressing, toileting, transferring, walking, feeding) [Fully functional (using the telephone, shopping, preparing meals, housekeeping, doing laundry, using] : Fully functional and needs no help or supervision to perform IADLs (using the telephone, shopping, preparing meals, housekeeping, doing laundry, using transportation, managing medications and managing finances) [Monthly or less (1 pt)] : Monthly or less (1 point) [Smoke Detector] : smoke detector [Carbon Monoxide Detector] : carbon monoxide detector [Seat Belt] :  uses seat belt [Sunscreen] : uses sunscreen [With Patient/Caregiver] : , with patient/caregiver [Designated Healthcare Proxy] : Designated healthcare proxy [Name: ___] : Health Care Proxy's Name: [unfilled]  [Relationship: ___] : Relationship: [unfilled] [Never] : Never [de-identified] : Formerly - 1 glass of wine nightly. [[id number]] : 0 [Change in mental status noted] : No change in mental status noted [Reports changes in hearing] : Reports no changes in hearing [Reports changes in dental health] : Reports no changes in dental health [MammogramDate] : 02/23 [PapSmearDate] : 07/22 [PapSmearComments] : Dr. Matias Dover. [BoneDensityDate] : 08/22 [BoneDensityComments] : 8/3/2022 - osteopenia femoral neck. [ColonoscopyDate] : 01/21 [ColonoscopyComments] : No polyps, 3 year follow-up due to family history.  Dr. Huggins. 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 [de-identified] : Contacts - near and farsighted.  [facility]. [AdvancecareDate] : 01/03/2024 [FreeTextEntry4] : Patient reports having a written Health Care Proxy and/or Living Will and will forward a copy.

## 2024-01-03 NOTE — ASSESSMENT
[FreeTextEntry1] : HCM - discussed diet, exercise, weight maintenance.  Labs drawn in office as below.  HIV testing offered and patient declined. Hepatitis C screen negative Jan 2023.  Patient received the influenza vaccination this season.  Patient is considering the [id number] COVID-19 booster.  Tdap UTD from 9/23/2018.  Shingrix #1 given today, and patient is to return in 2-6 months for the 2nd dose.  Continue screening mammograms every 1-2 years, and follow-up with Dr. Banks (breast surgeon).  Continue PAP smears as per Gyn.  Bone density showed mild osteopenia, and she can resume screening at age 65.  Screening colonoscopy normal Jan 2021 with Dr. Huggins, and patient continues to go every 3 years due to her family history (both parents had colon cancer).  Patient reports having a written Health Care Proxy and/or Living Will and will forward a copy.  CV - HTN stable on lisinopril.

## 2024-02-27 ENCOUNTER — NON-APPOINTMENT (OUTPATIENT)
Age: 64
End: 2024-02-27

## 2024-02-28 ENCOUNTER — APPOINTMENT (OUTPATIENT)
Dept: MAMMOGRAPHY | Facility: CLINIC | Age: 64
End: 2024-02-28
Payer: COMMERCIAL

## 2024-02-28 ENCOUNTER — RESULT REVIEW (OUTPATIENT)
Age: 64
End: 2024-02-28

## 2024-02-28 ENCOUNTER — APPOINTMENT (OUTPATIENT)
Dept: ULTRASOUND IMAGING | Facility: CLINIC | Age: 64
End: 2024-02-28
Payer: COMMERCIAL

## 2024-02-28 ENCOUNTER — OUTPATIENT (OUTPATIENT)
Dept: OUTPATIENT SERVICES | Facility: HOSPITAL | Age: 64
LOS: 1 days | End: 2024-02-28
Payer: COMMERCIAL

## 2024-02-28 DIAGNOSIS — Z00.8 ENCOUNTER FOR OTHER GENERAL EXAMINATION: ICD-10-CM

## 2024-02-28 DIAGNOSIS — N60.19 DIFFUSE CYSTIC MASTOPATHY OF UNSPECIFIED BREAST: ICD-10-CM

## 2024-02-28 PROCEDURE — 77063 BREAST TOMOSYNTHESIS BI: CPT | Mod: 26

## 2024-02-28 PROCEDURE — 77067 SCR MAMMO BI INCL CAD: CPT | Mod: 26

## 2024-02-28 PROCEDURE — 76641 ULTRASOUND BREAST COMPLETE: CPT | Mod: 26,50

## 2024-02-28 PROCEDURE — 77067 SCR MAMMO BI INCL CAD: CPT

## 2024-02-28 PROCEDURE — 77063 BREAST TOMOSYNTHESIS BI: CPT

## 2024-02-28 PROCEDURE — 76641 ULTRASOUND BREAST COMPLETE: CPT

## 2024-03-13 PROBLEM — Z12.11 COLON CANCER SCREENING: Status: ACTIVE | Noted: 2024-03-13

## 2024-03-13 PROBLEM — Z12.4 CERVICAL CANCER SCREENING: Status: ACTIVE | Noted: 2024-03-13

## 2024-03-20 ENCOUNTER — APPOINTMENT (OUTPATIENT)
Dept: OBGYN | Facility: CLINIC | Age: 64
End: 2024-03-20

## 2024-03-20 ENCOUNTER — APPOINTMENT (OUTPATIENT)
Dept: OBGYN | Facility: CLINIC | Age: 64
End: 2024-03-20
Payer: COMMERCIAL

## 2024-03-20 ENCOUNTER — RESULT CHARGE (OUTPATIENT)
Age: 64
End: 2024-03-20

## 2024-03-20 VITALS
DIASTOLIC BLOOD PRESSURE: 82 MMHG | SYSTOLIC BLOOD PRESSURE: 136 MMHG | HEART RATE: 75 BPM | HEIGHT: 62 IN | WEIGHT: 134 LBS | BODY MASS INDEX: 24.66 KG/M2

## 2024-03-20 DIAGNOSIS — Z12.4 ENCOUNTER FOR SCREENING FOR MALIGNANT NEOPLASM OF CERVIX: ICD-10-CM

## 2024-03-20 DIAGNOSIS — Z12.11 ENCOUNTER FOR SCREENING FOR MALIGNANT NEOPLASM OF COLON: ICD-10-CM

## 2024-03-20 DIAGNOSIS — Z13.820 ENCOUNTER FOR SCREENING FOR OSTEOPOROSIS: ICD-10-CM

## 2024-03-20 DIAGNOSIS — Z12.39 ENCOUNTER FOR OTHER SCREENING FOR MALIGNANT NEOPLASM OF BREAST: ICD-10-CM

## 2024-03-20 DIAGNOSIS — Z00.00 ENCOUNTER FOR GENERAL ADULT MEDICAL EXAMINATION W/OUT ABNORMAL FINDINGS: ICD-10-CM

## 2024-03-20 LAB
BILIRUB UR QL STRIP: NEGATIVE
CLARITY UR: CLEAR
COLLECTION METHOD: NORMAL
GLUCOSE UR-MCNC: NEGATIVE
HCG UR QL: 0 EU/DL
HEMOGLOBIN: 13.1
HGB UR QL STRIP.AUTO: NEGATIVE
KETONES UR-MCNC: NEGATIVE
LEUKOCYTE ESTERASE UR QL STRIP: NORMAL
NITRITE UR QL STRIP: NEGATIVE
PH UR STRIP: 7
PROT UR STRIP-MCNC: NEGATIVE
SP GR UR STRIP: 1

## 2024-03-20 PROCEDURE — 81003 URINALYSIS AUTO W/O SCOPE: CPT | Mod: QW

## 2024-03-20 PROCEDURE — 85018 HEMOGLOBIN: CPT | Mod: QW

## 2024-03-20 PROCEDURE — 99396 PREV VISIT EST AGE 40-64: CPT

## 2024-03-20 NOTE — PHYSICAL EXAM
[Chaperone Present] : A chaperone was present in the examining room during all aspects of the physical examination [Appropriately responsive] : appropriately responsive [Alert] : alert [No Acute Distress] : no acute distress [No Lymphadenopathy] : no lymphadenopathy [Soft] : soft [Non-tender] : non-tender [Non-distended] : non-distended [No HSM] : No HSM [No Lesions] : no lesions [No Mass] : no mass [Oriented x3] : oriented x3 [Examination Of The Breasts] : a normal appearance [No Discharge] : no discharge [No Masses] : no breast masses were palpable [Labia Majora] : normal [Labia Minora] : normal [Normal] : normal [Uterine Adnexae] : normal [FreeTextEntry1] : PHILIPPE RomeoC

## 2024-03-20 NOTE — PLAN
[FreeTextEntry1] : Patient to follow up in 1 year for annual GYN exam Mammogram and bilateral breast US due:  2/24 Rx given  Colonoscopy due: 2025, deferred rectal exam today s/s fissure. she follows up regularly  Bone density due:  10/24, rx given Pap ordered Hemoccult ordered  PMB precautions reviewed SBE reviewed All questions answered, patient agreeable with plan.

## 2024-03-20 NOTE — HISTORY OF PRESENT ILLNESS
[TextBox_4] : 64 year old female presents for her annual visit. Denies GYN complaints at this time. She was a patient of dr. barrios who retired. PMH:  2 vaginal deliveries, D&C, family hx of colon ca (mother).

## 2024-03-25 ENCOUNTER — RX RENEWAL (OUTPATIENT)
Age: 64
End: 2024-03-25

## 2024-03-25 LAB — CYTOLOGY CVX/VAG DOC THIN PREP: ABNORMAL

## 2024-04-10 ENCOUNTER — APPOINTMENT (OUTPATIENT)
Dept: INTERNAL MEDICINE | Facility: CLINIC | Age: 64
End: 2024-04-10
Payer: COMMERCIAL

## 2024-04-10 VITALS
DIASTOLIC BLOOD PRESSURE: 70 MMHG | WEIGHT: 139 LBS | BODY MASS INDEX: 25.58 KG/M2 | SYSTOLIC BLOOD PRESSURE: 122 MMHG | HEIGHT: 62 IN

## 2024-04-10 DIAGNOSIS — I10 ESSENTIAL (PRIMARY) HYPERTENSION: ICD-10-CM

## 2024-04-10 PROCEDURE — G2211 COMPLEX E/M VISIT ADD ON: CPT | Mod: NC

## 2024-04-10 PROCEDURE — 90750 HZV VACC RECOMBINANT IM: CPT

## 2024-04-10 PROCEDURE — 99213 OFFICE O/P EST LOW 20 MIN: CPT | Mod: 25

## 2024-04-10 PROCEDURE — 90471 IMMUNIZATION ADMIN: CPT

## 2024-04-10 RX ORDER — LISINOPRIL 10 MG/1
10 TABLET ORAL DAILY
Qty: 90 | Refills: 1 | Status: ACTIVE | COMMUNITY
Start: 2023-05-09 | End: 1900-01-01

## 2024-04-10 NOTE — HISTORY OF PRESENT ILLNESS
[de-identified] : 64-year-old female presents for initial visit to establish care and to review her medical history and prescription renewal. She has a history of mild hypertension presently on lisinopril 10 mg daily.  Also history of anal fissure. Last colonoscopy was in 2021. Has been generally well without recent illness.

## 2024-04-10 NOTE — ASSESSMENT
[FreeTextEntry1] : Her exam is unremarkable.  Hypertension-blood pressure is well-controlled for now she will continue lisinopril 10 mg daily.  Last colonoscopy was in 2021 and that was a normal exam.  She received second dose of Shingrix today.

## 2024-04-10 NOTE — HEALTH RISK ASSESSMENT
[Yes] : Yes [Monthly or less (1 pt)] : Monthly or less (1 point) [1 or 2 (0 pts)] : 1 or 2 (0 points) [Never (0 pts)] : Never (0 points) [0] : 2) Feeling down, depressed, or hopeless: Not at all (0) [PHQ-2 Negative - No further assessment needed] : PHQ-2 Negative - No further assessment needed [Audit-CScore] : 1 [[id number]] : 0 [Never] : Never

## 2024-04-10 NOTE — PHYSICAL EXAM
[No Acute Distress] : no acute distress [No JVD] : no jugular venous distention [Clear to Auscultation] : lungs were clear to auscultation bilaterally [Regular Rhythm] : with a regular rhythm [Normal S1, S2] : normal S1 and S2 [No Murmur] : no murmur heard [No Edema] : there was no peripheral edema [No Focal Deficits] : no focal deficits [Alert and Oriented x3] : oriented to person, place, and time

## 2024-05-08 ENCOUNTER — APPOINTMENT (OUTPATIENT)
Dept: INTERNAL MEDICINE | Facility: CLINIC | Age: 64
End: 2024-05-08

## 2025-01-06 ENCOUNTER — APPOINTMENT (OUTPATIENT)
Dept: INTERNAL MEDICINE | Facility: CLINIC | Age: 65
End: 2025-01-06

## 2025-01-13 ENCOUNTER — APPOINTMENT (OUTPATIENT)
Dept: INTERNAL MEDICINE | Facility: CLINIC | Age: 65
End: 2025-01-13
Payer: COMMERCIAL

## 2025-01-13 ENCOUNTER — LABORATORY RESULT (OUTPATIENT)
Age: 65
End: 2025-01-13

## 2025-01-13 VITALS
HEIGHT: 63 IN | SYSTOLIC BLOOD PRESSURE: 110 MMHG | WEIGHT: 137 LBS | BODY MASS INDEX: 24.27 KG/M2 | DIASTOLIC BLOOD PRESSURE: 74 MMHG

## 2025-01-13 DIAGNOSIS — I10 ESSENTIAL (PRIMARY) HYPERTENSION: ICD-10-CM

## 2025-01-13 DIAGNOSIS — Z00.00 ENCOUNTER FOR GENERAL ADULT MEDICAL EXAMINATION W/OUT ABNORMAL FINDINGS: ICD-10-CM

## 2025-01-13 PROCEDURE — 99396 PREV VISIT EST AGE 40-64: CPT

## 2025-01-13 PROCEDURE — 36415 COLL VENOUS BLD VENIPUNCTURE: CPT

## 2025-01-14 LAB
ALBUMIN SERPL ELPH-MCNC: 4.2 G/DL
ALP BLD-CCNC: 50 U/L
ALT SERPL-CCNC: 15 U/L
ANION GAP SERPL CALC-SCNC: 13 MMOL/L
AST SERPL-CCNC: 16 U/L
BASOPHILS # BLD AUTO: 0.05 K/UL
BASOPHILS NFR BLD AUTO: 1.1 %
BILIRUB SERPL-MCNC: 0.3 MG/DL
BUN SERPL-MCNC: 13 MG/DL
CALCIUM SERPL-MCNC: 9.5 MG/DL
CHLORIDE SERPL-SCNC: 106 MMOL/L
CHOLEST SERPL-MCNC: 234 MG/DL
CO2 SERPL-SCNC: 22 MMOL/L
CREAT SERPL-MCNC: 0.85 MG/DL
EGFR: 76 ML/MIN/1.73M2
EOSINOPHIL # BLD AUTO: 0.11 K/UL
EOSINOPHIL NFR BLD AUTO: 2.4 %
GLUCOSE SERPL-MCNC: 90 MG/DL
HCT VFR BLD CALC: 37.5 %
HDLC SERPL-MCNC: 107 MG/DL
HGB BLD-MCNC: 12.6 G/DL
IMM GRANULOCYTES NFR BLD AUTO: 0.2 %
LDLC SERPL CALC-MCNC: 119 MG/DL
LYMPHOCYTES # BLD AUTO: 1.61 K/UL
LYMPHOCYTES NFR BLD AUTO: 35.9 %
MAN DIFF?: NORMAL
MCHC RBC-ENTMCNC: 31.3 PG
MCHC RBC-ENTMCNC: 33.6 G/DL
MCV RBC AUTO: 93.3 FL
MONOCYTES # BLD AUTO: 0.39 K/UL
MONOCYTES NFR BLD AUTO: 8.7 %
NEUTROPHILS # BLD AUTO: 2.32 K/UL
NEUTROPHILS NFR BLD AUTO: 51.7 %
NONHDLC SERPL-MCNC: 127 MG/DL
PLATELET # BLD AUTO: 227 K/UL
POTASSIUM SERPL-SCNC: 4.4 MMOL/L
PROT SERPL-MCNC: 6.4 G/DL
RBC # BLD: 4.02 M/UL
RBC # FLD: 13.2 %
SODIUM SERPL-SCNC: 142 MMOL/L
TRIGL SERPL-MCNC: 49 MG/DL
TSH SERPL-ACNC: 4.27 UIU/ML
WBC # FLD AUTO: 4.49 K/UL

## 2025-03-05 ENCOUNTER — APPOINTMENT (OUTPATIENT)
Dept: MAMMOGRAPHY | Facility: CLINIC | Age: 65
End: 2025-03-05
Payer: COMMERCIAL

## 2025-03-05 ENCOUNTER — RESULT REVIEW (OUTPATIENT)
Age: 65
End: 2025-03-05

## 2025-03-05 ENCOUNTER — OUTPATIENT (OUTPATIENT)
Dept: OUTPATIENT SERVICES | Facility: HOSPITAL | Age: 65
LOS: 1 days | End: 2025-03-05
Payer: COMMERCIAL

## 2025-03-05 ENCOUNTER — APPOINTMENT (OUTPATIENT)
Dept: ULTRASOUND IMAGING | Facility: CLINIC | Age: 65
End: 2025-03-05
Payer: COMMERCIAL

## 2025-03-05 DIAGNOSIS — Z00.8 ENCOUNTER FOR OTHER GENERAL EXAMINATION: ICD-10-CM

## 2025-03-05 DIAGNOSIS — Z12.39 ENCOUNTER FOR OTHER SCREENING FOR MALIGNANT NEOPLASM OF BREAST: ICD-10-CM

## 2025-03-05 DIAGNOSIS — N60.19 DIFFUSE CYSTIC MASTOPATHY OF UNSPECIFIED BREAST: ICD-10-CM

## 2025-03-05 PROCEDURE — 77067 SCR MAMMO BI INCL CAD: CPT

## 2025-03-05 PROCEDURE — 77067 SCR MAMMO BI INCL CAD: CPT | Mod: 26

## 2025-03-05 PROCEDURE — 76641 ULTRASOUND BREAST COMPLETE: CPT | Mod: 26,50

## 2025-03-05 PROCEDURE — 76641 ULTRASOUND BREAST COMPLETE: CPT

## 2025-03-05 PROCEDURE — 77063 BREAST TOMOSYNTHESIS BI: CPT | Mod: 26

## 2025-03-05 PROCEDURE — 77063 BREAST TOMOSYNTHESIS BI: CPT

## 2025-03-26 ENCOUNTER — APPOINTMENT (OUTPATIENT)
Dept: OBGYN | Facility: CLINIC | Age: 65
End: 2025-03-26
Payer: COMMERCIAL

## 2025-03-26 VITALS
DIASTOLIC BLOOD PRESSURE: 88 MMHG | BODY MASS INDEX: 24.1 KG/M2 | HEIGHT: 63 IN | HEART RATE: 74 BPM | WEIGHT: 136 LBS | SYSTOLIC BLOOD PRESSURE: 143 MMHG

## 2025-03-26 DIAGNOSIS — R92.30 DENSE BREASTS, UNSPECIFIED: ICD-10-CM

## 2025-03-26 DIAGNOSIS — Z01.419 ENCOUNTER FOR GYNECOLOGICAL EXAMINATION (GENERAL) (ROUTINE) W/OUT ABNORMAL FINDINGS: ICD-10-CM

## 2025-03-26 DIAGNOSIS — Z12.4 ENCOUNTER FOR SCREENING FOR MALIGNANT NEOPLASM OF CERVIX: ICD-10-CM

## 2025-03-26 DIAGNOSIS — Z12.11 ENCOUNTER FOR SCREENING FOR MALIGNANT NEOPLASM OF COLON: ICD-10-CM

## 2025-03-26 LAB
BILIRUB UR QL STRIP: NEGATIVE
CLARITY UR: CLEAR
COLLECTION METHOD: NORMAL
DATE COLLECTED: NORMAL
GLUCOSE UR-MCNC: NEGATIVE
HCG UR QL: 0 EU/DL
HEMOCCULT SP1 STL QL: NEGATIVE
HEMOGLOBIN: 14
HGB UR QL STRIP.AUTO: NEGATIVE
KETONES UR-MCNC: NEGATIVE
LEUKOCYTE ESTERASE UR QL STRIP: NEGATIVE
NITRITE UR QL STRIP: NEGATIVE
PH UR STRIP: 5
PROT UR STRIP-MCNC: NEGATIVE
QUALITY CONTROL: YES
SP GR UR STRIP: 1

## 2025-03-26 PROCEDURE — 85018 HEMOGLOBIN: CPT | Mod: QW

## 2025-03-26 PROCEDURE — 82270 OCCULT BLOOD FECES: CPT

## 2025-03-26 PROCEDURE — 81003 URINALYSIS AUTO W/O SCOPE: CPT | Mod: QW

## 2025-03-26 PROCEDURE — 99397 PER PM REEVAL EST PAT 65+ YR: CPT

## 2025-03-26 PROCEDURE — 99459 PELVIC EXAMINATION: CPT

## 2025-04-01 LAB — CYTOLOGY CVX/VAG DOC THIN PREP: ABNORMAL

## 2025-04-09 ENCOUNTER — APPOINTMENT (OUTPATIENT)
Dept: RADIOLOGY | Facility: CLINIC | Age: 65
End: 2025-04-09
Payer: COMMERCIAL

## 2025-04-09 ENCOUNTER — OUTPATIENT (OUTPATIENT)
Dept: OUTPATIENT SERVICES | Facility: HOSPITAL | Age: 65
LOS: 1 days | End: 2025-04-09
Payer: COMMERCIAL

## 2025-04-09 DIAGNOSIS — R92.30 DENSE BREASTS, UNSPECIFIED: ICD-10-CM

## 2025-04-09 PROCEDURE — 77080 DXA BONE DENSITY AXIAL: CPT | Mod: 26

## 2025-04-09 PROCEDURE — 77080 DXA BONE DENSITY AXIAL: CPT

## 2025-04-14 ENCOUNTER — NON-APPOINTMENT (OUTPATIENT)
Age: 65
End: 2025-04-14

## 2025-06-03 NOTE — ED ADULT TRIAGE NOTE - ESI TRIAGE ACUITY LEVEL, MLM
What Type Of Note Output Would You Prefer (Optional)?: Bullet Format
How Severe Is Your Skin Lesion?: mild
Has Your Skin Lesion Been Treated?: not been treated
Is This A New Presentation, Or A Follow-Up?: Skin Lesions
4

## 2025-08-08 ENCOUNTER — APPOINTMENT (OUTPATIENT)
Dept: GASTROENTEROLOGY | Facility: CLINIC | Age: 65
End: 2025-08-08
Payer: COMMERCIAL

## 2025-08-08 VITALS
HEIGHT: 63 IN | DIASTOLIC BLOOD PRESSURE: 78 MMHG | BODY MASS INDEX: 24.45 KG/M2 | WEIGHT: 138 LBS | SYSTOLIC BLOOD PRESSURE: 118 MMHG

## 2025-08-08 DIAGNOSIS — Z12.11 ENCOUNTER FOR SCREENING FOR MALIGNANT NEOPLASM OF COLON: ICD-10-CM

## 2025-08-08 PROCEDURE — 99204 OFFICE O/P NEW MOD 45 MIN: CPT

## 2025-08-08 RX ORDER — POLYETHYLENE GLYCOL 3350, SODIUM SULFATE, POTASSIUM CHLORIDE, MAGNESIUM SULFATE, AND SODIUM CHLORIDE FOR ORAL SOLUTION 178.7-7.3G
178.7 KIT ORAL
Qty: 2 | Refills: 0 | Status: ACTIVE | COMMUNITY
Start: 2025-08-08 | End: 1900-01-01

## 2025-08-18 ENCOUNTER — APPOINTMENT (OUTPATIENT)
Dept: INTERNAL MEDICINE | Facility: CLINIC | Age: 65
End: 2025-08-18

## 2025-08-19 ENCOUNTER — APPOINTMENT (OUTPATIENT)
Dept: PULMONOLOGY | Facility: CLINIC | Age: 65
End: 2025-08-19

## 2025-08-19 ENCOUNTER — APPOINTMENT (OUTPATIENT)
Dept: INTERNAL MEDICINE | Facility: CLINIC | Age: 65
End: 2025-08-19
Payer: COMMERCIAL

## 2025-08-19 VITALS
HEART RATE: 64 BPM | HEIGHT: 62 IN | WEIGHT: 138 LBS | TEMPERATURE: 98 F | DIASTOLIC BLOOD PRESSURE: 82 MMHG | OXYGEN SATURATION: 96 % | SYSTOLIC BLOOD PRESSURE: 110 MMHG | BODY MASS INDEX: 25.4 KG/M2

## 2025-08-19 DIAGNOSIS — J45.909 UNSPECIFIED ASTHMA, UNCOMPLICATED: ICD-10-CM

## 2025-08-19 DIAGNOSIS — R05.9 COUGH, UNSPECIFIED: ICD-10-CM

## 2025-08-19 DIAGNOSIS — R06.2 WHEEZING: ICD-10-CM

## 2025-08-19 PROCEDURE — 94727 GAS DIL/WSHOT DETER LNG VOL: CPT

## 2025-08-19 PROCEDURE — 94729 DIFFUSING CAPACITY: CPT

## 2025-08-19 PROCEDURE — 94060 EVALUATION OF WHEEZING: CPT

## 2025-08-19 PROCEDURE — 99203 OFFICE O/P NEW LOW 30 MIN: CPT | Mod: 25

## 2025-08-19 PROCEDURE — ZZZZZ: CPT

## 2025-08-19 PROCEDURE — 95012 NITRIC OXIDE EXP GAS DETER: CPT

## 2025-08-19 RX ORDER — PREDNISONE 20 MG/1
20 TABLET ORAL DAILY
Qty: 5 | Refills: 0 | Status: ACTIVE | COMMUNITY
Start: 2025-08-19 | End: 1900-01-01

## 2025-08-19 RX ORDER — ESOMEPRAZOLE MAGNESIUM 20 MG/1
20 CAPSULE, DELAYED RELEASE ORAL DAILY
Qty: 60 | Refills: 0 | Status: ACTIVE | COMMUNITY
Start: 2025-08-19 | End: 1900-01-01

## 2025-08-27 PROBLEM — R05.9 COUGH: Status: ACTIVE | Noted: 2025-08-27

## 2025-09-09 ENCOUNTER — APPOINTMENT (OUTPATIENT)
Dept: RADIOLOGY | Facility: CLINIC | Age: 65
End: 2025-09-09
Payer: COMMERCIAL

## 2025-09-09 PROCEDURE — 71046 X-RAY EXAM CHEST 2 VIEWS: CPT | Mod: 26

## 2025-09-18 ENCOUNTER — APPOINTMENT (OUTPATIENT)
Dept: PULMONOLOGY | Facility: CLINIC | Age: 65
End: 2025-09-18
Payer: COMMERCIAL

## 2025-09-18 VITALS
BODY MASS INDEX: 25.21 KG/M2 | HEART RATE: 58 BPM | DIASTOLIC BLOOD PRESSURE: 82 MMHG | HEIGHT: 62 IN | TEMPERATURE: 98.4 F | OXYGEN SATURATION: 97 % | SYSTOLIC BLOOD PRESSURE: 126 MMHG | RESPIRATION RATE: 16 BRPM | WEIGHT: 137 LBS

## 2025-09-18 DIAGNOSIS — R05.9 COUGH, UNSPECIFIED: ICD-10-CM

## 2025-09-18 DIAGNOSIS — R06.2 WHEEZING: ICD-10-CM

## 2025-09-18 PROCEDURE — 99213 OFFICE O/P EST LOW 20 MIN: CPT
